# Patient Record
Sex: FEMALE | Race: WHITE | NOT HISPANIC OR LATINO
[De-identification: names, ages, dates, MRNs, and addresses within clinical notes are randomized per-mention and may not be internally consistent; named-entity substitution may affect disease eponyms.]

---

## 2018-09-14 ENCOUNTER — TRANSCRIPTION ENCOUNTER (OUTPATIENT)
Age: 43
End: 2018-09-14

## 2018-12-03 ENCOUNTER — TRANSCRIPTION ENCOUNTER (OUTPATIENT)
Age: 43
End: 2018-12-03

## 2019-01-30 ENCOUNTER — RESULT REVIEW (OUTPATIENT)
Age: 44
End: 2019-01-30

## 2019-01-30 ENCOUNTER — APPOINTMENT (OUTPATIENT)
Dept: OBGYN | Facility: CLINIC | Age: 44
End: 2019-01-30
Payer: COMMERCIAL

## 2019-01-30 PROCEDURE — 36415 COLL VENOUS BLD VENIPUNCTURE: CPT

## 2019-01-30 PROCEDURE — 99386 PREV VISIT NEW AGE 40-64: CPT

## 2019-02-05 ENCOUNTER — TRANSCRIPTION ENCOUNTER (OUTPATIENT)
Age: 44
End: 2019-02-05

## 2019-02-06 ENCOUNTER — APPOINTMENT (OUTPATIENT)
Dept: ULTRASOUND IMAGING | Facility: IMAGING CENTER | Age: 44
End: 2019-02-06
Payer: COMMERCIAL

## 2019-02-06 ENCOUNTER — OUTPATIENT (OUTPATIENT)
Dept: OUTPATIENT SERVICES | Facility: HOSPITAL | Age: 44
LOS: 1 days | End: 2019-02-06
Payer: COMMERCIAL

## 2019-02-06 DIAGNOSIS — Z00.8 ENCOUNTER FOR OTHER GENERAL EXAMINATION: ICD-10-CM

## 2019-02-06 PROCEDURE — 76770 US EXAM ABDO BACK WALL COMP: CPT | Mod: 26

## 2019-02-06 PROCEDURE — 76770 US EXAM ABDO BACK WALL COMP: CPT

## 2019-02-25 ENCOUNTER — APPOINTMENT (OUTPATIENT)
Dept: ULTRASOUND IMAGING | Facility: CLINIC | Age: 44
End: 2019-02-25
Payer: COMMERCIAL

## 2019-02-25 ENCOUNTER — OUTPATIENT (OUTPATIENT)
Dept: OUTPATIENT SERVICES | Facility: HOSPITAL | Age: 44
LOS: 1 days | End: 2019-02-25
Payer: COMMERCIAL

## 2019-02-25 DIAGNOSIS — Z00.8 ENCOUNTER FOR OTHER GENERAL EXAMINATION: ICD-10-CM

## 2019-02-25 PROCEDURE — 76830 TRANSVAGINAL US NON-OB: CPT | Mod: 26

## 2019-02-25 PROCEDURE — 76830 TRANSVAGINAL US NON-OB: CPT

## 2019-02-25 PROCEDURE — 76856 US EXAM PELVIC COMPLETE: CPT | Mod: 26

## 2019-02-25 PROCEDURE — 76856 US EXAM PELVIC COMPLETE: CPT

## 2019-02-27 ENCOUNTER — APPOINTMENT (OUTPATIENT)
Dept: MAMMOGRAPHY | Facility: IMAGING CENTER | Age: 44
End: 2019-02-27
Payer: COMMERCIAL

## 2019-02-27 ENCOUNTER — APPOINTMENT (OUTPATIENT)
Dept: ULTRASOUND IMAGING | Facility: IMAGING CENTER | Age: 44
End: 2019-02-27
Payer: COMMERCIAL

## 2019-02-27 ENCOUNTER — OUTPATIENT (OUTPATIENT)
Dept: OUTPATIENT SERVICES | Facility: HOSPITAL | Age: 44
LOS: 1 days | End: 2019-02-27
Payer: COMMERCIAL

## 2019-02-27 DIAGNOSIS — Z00.8 ENCOUNTER FOR OTHER GENERAL EXAMINATION: ICD-10-CM

## 2019-02-27 PROCEDURE — 77063 BREAST TOMOSYNTHESIS BI: CPT | Mod: 26

## 2019-02-27 PROCEDURE — 76641 ULTRASOUND BREAST COMPLETE: CPT

## 2019-02-27 PROCEDURE — 77067 SCR MAMMO BI INCL CAD: CPT | Mod: 26

## 2019-02-27 PROCEDURE — 76641 ULTRASOUND BREAST COMPLETE: CPT | Mod: 26,50

## 2019-02-27 PROCEDURE — 77063 BREAST TOMOSYNTHESIS BI: CPT

## 2019-02-27 PROCEDURE — 77067 SCR MAMMO BI INCL CAD: CPT

## 2019-03-25 ENCOUNTER — APPOINTMENT (OUTPATIENT)
Dept: OBGYN | Facility: CLINIC | Age: 44
End: 2019-03-25
Payer: COMMERCIAL

## 2019-03-25 PROCEDURE — 36415 COLL VENOUS BLD VENIPUNCTURE: CPT

## 2019-03-25 PROCEDURE — 76831 ECHO EXAM UTERUS: CPT

## 2019-03-25 PROCEDURE — 58340 CATHETER FOR HYSTEROGRAPHY: CPT

## 2019-11-06 ENCOUNTER — APPOINTMENT (OUTPATIENT)
Dept: MRI IMAGING | Facility: IMAGING CENTER | Age: 44
End: 2019-11-06
Payer: COMMERCIAL

## 2019-11-06 ENCOUNTER — OUTPATIENT (OUTPATIENT)
Dept: OUTPATIENT SERVICES | Facility: HOSPITAL | Age: 44
LOS: 1 days | End: 2019-11-06
Payer: COMMERCIAL

## 2019-11-06 DIAGNOSIS — Z00.8 ENCOUNTER FOR OTHER GENERAL EXAMINATION: ICD-10-CM

## 2019-11-06 PROCEDURE — A9585: CPT

## 2019-11-06 PROCEDURE — C8908: CPT

## 2019-11-06 PROCEDURE — 77049 MRI BREAST C-+ W/CAD BI: CPT | Mod: 26

## 2019-11-06 PROCEDURE — C8937: CPT

## 2019-11-15 ENCOUNTER — RESULT REVIEW (OUTPATIENT)
Age: 44
End: 2019-11-15

## 2019-11-15 ENCOUNTER — OUTPATIENT (OUTPATIENT)
Dept: OUTPATIENT SERVICES | Facility: HOSPITAL | Age: 44
LOS: 1 days | End: 2019-11-15
Payer: COMMERCIAL

## 2019-11-15 ENCOUNTER — APPOINTMENT (OUTPATIENT)
Dept: ULTRASOUND IMAGING | Facility: CLINIC | Age: 44
End: 2019-11-15
Payer: COMMERCIAL

## 2019-11-15 DIAGNOSIS — N63.0 UNSPECIFIED LUMP IN UNSPECIFIED BREAST: ICD-10-CM

## 2019-11-15 PROCEDURE — 88305 TISSUE EXAM BY PATHOLOGIST: CPT | Mod: 26

## 2019-11-15 PROCEDURE — 88305 TISSUE EXAM BY PATHOLOGIST: CPT

## 2019-11-15 PROCEDURE — 19083 BX BREAST 1ST LESION US IMAG: CPT | Mod: LT

## 2019-11-15 PROCEDURE — 77065 DX MAMMO INCL CAD UNI: CPT | Mod: 26,LT

## 2019-11-15 PROCEDURE — 19083 BX BREAST 1ST LESION US IMAG: CPT

## 2019-11-15 PROCEDURE — 77065 DX MAMMO INCL CAD UNI: CPT

## 2020-04-11 ENCOUNTER — TRANSCRIPTION ENCOUNTER (OUTPATIENT)
Age: 45
End: 2020-04-11

## 2020-05-26 ENCOUNTER — OUTPATIENT (OUTPATIENT)
Dept: OUTPATIENT SERVICES | Facility: HOSPITAL | Age: 45
LOS: 1 days | End: 2020-05-26
Payer: COMMERCIAL

## 2020-05-26 ENCOUNTER — APPOINTMENT (OUTPATIENT)
Dept: RADIOLOGY | Facility: IMAGING CENTER | Age: 45
End: 2020-05-26
Payer: COMMERCIAL

## 2020-05-26 ENCOUNTER — APPOINTMENT (OUTPATIENT)
Dept: CT IMAGING | Facility: IMAGING CENTER | Age: 45
End: 2020-05-26
Payer: COMMERCIAL

## 2020-05-26 DIAGNOSIS — Z00.8 ENCOUNTER FOR OTHER GENERAL EXAMINATION: ICD-10-CM

## 2020-05-26 PROCEDURE — 71260 CT THORAX DX C+: CPT | Mod: 26

## 2020-05-26 PROCEDURE — 71260 CT THORAX DX C+: CPT

## 2020-05-26 PROCEDURE — 71046 X-RAY EXAM CHEST 2 VIEWS: CPT | Mod: 26

## 2020-05-26 PROCEDURE — 71046 X-RAY EXAM CHEST 2 VIEWS: CPT

## 2020-06-03 ENCOUNTER — APPOINTMENT (OUTPATIENT)
Dept: PULMONOLOGY | Facility: CLINIC | Age: 45
End: 2020-06-03
Payer: COMMERCIAL

## 2020-06-03 VITALS
SYSTOLIC BLOOD PRESSURE: 110 MMHG | BODY MASS INDEX: 26.5 KG/M2 | HEIGHT: 62 IN | OXYGEN SATURATION: 98 % | DIASTOLIC BLOOD PRESSURE: 70 MMHG | RESPIRATION RATE: 16 BRPM | WEIGHT: 144 LBS | HEART RATE: 65 BPM | TEMPERATURE: 98.1 F

## 2020-06-03 DIAGNOSIS — Z80.9 FAMILY HISTORY OF MALIGNANT NEOPLASM, UNSPECIFIED: ICD-10-CM

## 2020-06-03 DIAGNOSIS — Z80.1 FAMILY HISTORY OF MALIGNANT NEOPLASM OF TRACHEA, BRONCHUS AND LUNG: ICD-10-CM

## 2020-06-03 DIAGNOSIS — Z72.820 SLEEP DEPRIVATION: ICD-10-CM

## 2020-06-03 DIAGNOSIS — Z80.8 FAMILY HISTORY OF MALIGNANT NEOPLASM OF OTHER ORGANS OR SYSTEMS: ICD-10-CM

## 2020-06-03 DIAGNOSIS — Z80.3 FAMILY HISTORY OF MALIGNANT NEOPLASM OF BREAST: ICD-10-CM

## 2020-06-03 LAB
CRP SERPL-MCNC: 0.12 MG/DL
ERYTHROCYTE [SEDIMENTATION RATE] IN BLOOD BY WESTERGREN METHOD: 17 MM/HR
FERRITIN SERPL-MCNC: 36 NG/ML

## 2020-06-03 PROCEDURE — 71046 X-RAY EXAM CHEST 2 VIEWS: CPT

## 2020-06-03 PROCEDURE — 94618 PULMONARY STRESS TESTING: CPT

## 2020-06-03 PROCEDURE — 99204 OFFICE O/P NEW MOD 45 MIN: CPT | Mod: 25

## 2020-06-03 RX ORDER — ALBUTEROL SULFATE 90 UG/1
108 (90 BASE) AEROSOL, METERED RESPIRATORY (INHALATION) EVERY 6 HOURS
Qty: 1 | Refills: 2 | Status: ACTIVE | COMMUNITY
Start: 2020-06-03 | End: 1900-01-01

## 2020-06-03 RX ORDER — PREDNISONE 10 MG/1
10 TABLET ORAL
Qty: 50 | Refills: 0 | Status: ACTIVE | COMMUNITY
Start: 2020-06-03 | End: 1900-01-01

## 2020-06-03 NOTE — ADDENDUM
[FreeTextEntry1] : Documented by Leodan Koenig acting as a scribe for Dr. Terrence Mayer on 06/03/2020.\par \par All medical record entries made by the Scribe were at my, Dr. Terrence Mayer's, direction and personally dictated by me on 06/03/2020. I have reviewed the chart and agree that the record accurately reflects my personal performance of the history, physical exam, assessment and plan. I have also personally directed, reviewed, and agree with the discharge instructions.

## 2020-06-03 NOTE — ASSESSMENT
[FreeTextEntry1] : Ms. LOPEZ is a 44 year old female with a history of nonsmoker, kidney stones, BRCA positive,overweight who comes into the office today for pulmonary evaluation- recent s/p COVID 19 infection; SOB, chest pressure\par \par The patient's shortness of breath is multifactorial due to:\par -pulmonary disease \par      -s/p COVID 19 Infection,  Bronchospasms/ RADS/?asthma, GERD/ LPR\par -poor breathing mechanics \par -overweight/out of shape\par -?cardiac disease (?Pleuropericarditis)\par \par \par Problem 1: s/p COVID 19 Infection residual inflammation\par -Complete blood work for CRP, D-dimer, Ferritin levels\par Immune Support Recommendations:\par -OTC Vitamin C 500mg BID \par -OTC Quercetin 250-500mg BID \par -OTC Zinc 75-100mg per day \par -OTC Melatonin 1 or 2 mg a night \par -OTC Vitamin D 1-4000mg per day \par -OTC Tonic Water 8oz per day\par \par Problem 2: Bronchospasms/ RADS/?asthma\par -add Symbicort (160) 2 inhalations BID\par -Add a course of Prednisone; 30 mg for 5 days, then 20 mg for 5 days, then 10 mg for 5 days \par -Information sheet given to the patient to be reviewed, this medication is never to be used without consulting the prescribing physician. Proper dietary restraint is necessary specifically salt containing foods, if any reaction may occur should be reported. \par -Inhaler technique reviewed as well as oral hygiene techniques reviewed with patient. Avoidance of cold air, extremes of temperature, rescue inhaler should be used before exercise. Order of medication reviewed with patient. Recommended use of a cool mist humidifier in the bedroom.\par -Asthma is believed to be caused by inherited (genetic) and environmental factor, but its exact cause is unknown. Asthma may be triggered by allergens, lung infections, or irritants in the air. Asthma triggers are different for each person  \par \par Problem 3: GERD/ LPR\par -continue Omeprazole 20 mg or 40 mg before breakfast\par -Rule of 2s: avoid eating too much, eating too fast, eating too late, eating too spicy, eating too lousy, eating two hours before bed.\par -Things to avoid including overeating, spicy foods, tight clothing, eating within three hours of bed, this list is not all inclusive. \par -For treatment of reflux, possible options discussed including diet control, H2 blockers, PPIs, as well as coating motility agents discussed as treatment options. Timing of meals and proximity of last meal to sleep were discussed. If symptoms persist, a formal gastrointestinal evaluation is needed.\par \par Problem 4: Poor Mechanics of Breathing\par - Proper breathing techniques were reviewed with an emphasis of exhalation. Patient instructed to breath in for 1 second and out for four seconds. Patient was encouraged to not talk while walking. \par \par Problem 5: Overweight\par -Weight loss, exercise, and diet control were discussed and are highly encouraged. Treatment options were given such as, aqua therapy, and contacting a nutritionist. Recommended to use the elliptical, stationary bike, less use of treadmill. Mindful eating was explained to the patient Obesity is associated with worsening asthma, shortness of breath, and potential for cardiac disease, diabetes, and other underlying medical conditions. \par \par Problem 6: Cardiac (?Pleuropericarditis) \par -recommended to follow up with a cardiologist\par -continue baby Aspirin Rx\par -Complete ESR\par \par Problem 7: health maintenance\par -s/p influenza vaccine\par -recommended strep pneumonia vaccines after age 65: Prevnar-13 vaccine, followed by Pneumo vaccine 23 one year following\par -recommended early intervention for URIs\par -recommended regular osteoporosis evaluations\par -recommended early dermatological evaluations\par -recommended after the age of 50 to the age of 70, colonoscopy every 5 years \par \par  Follow up in 6-8 weeks\par -he  is recommended to call with any changes, questions, or concerns.

## 2020-06-03 NOTE — PROCEDURE
[FreeTextEntry1] : CXR reveals a normal sized heart; reduced anterior clear space. no evidence of infiltrate or effusion--a normal appearing chest radiograph \par \par \par CT Chest (5.26.2020) reveals normal CT of chest.\par \par 6 minute walk test reveals a low saturation of 97% with no evidence of dyspnea or fatigue; walked 440.1 meters.

## 2020-06-03 NOTE — HISTORY OF PRESENT ILLNESS
[TextBox_4] : Ms. LOPEZ is a 44 year old female presenting to the office today for initial pulmonary evaluation. Her chief complaint is\par \par -she notes s/p COVID 19 infection without official testing\par -she notes albuterol inhaler did not help\par -she notes Z hector rx\par -she notes severe palpitations\par -she notes positive COVID 19 antibody test 5.4.2020\par -she notes Sx of loss of taste and minimal SOB \par -she notes dry cough nonproductive\par -she notes feelings of vibrations in chest\par -she notes feelings of inability to reach full breath capacity\par -she notes orthopnea\par -she notes cardiac monitor \par -she notes chest pressure exacerbated by learning back\par -she notes energy levels of 7/10\par -she notes exercise running half marathons\par -she notes feelings of limitation\par -she notes 2 hours of sleep on average due to Sx\par -she notes gained some weight during quarantine\par -she notes senses of smell and taste are returning\par -she notes sinuses are not leaky, drippy, or congested\par -she notes reflux treated my omeprazole 1 episode a week\par -she denies PND\par -she denies underlying seasonal allergies\par -She notes Her bowels are regular \par -she denies cramps\par \par -she denies any diarrhea, constipation, dysphagia, dizziness, leg swelling, leg pain, itchy eyes, itchy ears, sour taste in the mouth, myalgias or arthralgias.

## 2020-06-04 LAB — DEPRECATED D DIMER PPP IA-ACNC: 227 NG/ML DDU

## 2020-06-17 ENCOUNTER — OUTPATIENT (OUTPATIENT)
Dept: OUTPATIENT SERVICES | Facility: HOSPITAL | Age: 45
LOS: 1 days | End: 2020-06-17
Payer: COMMERCIAL

## 2020-06-17 ENCOUNTER — APPOINTMENT (OUTPATIENT)
Dept: CARDIOLOGY | Facility: CLINIC | Age: 45
End: 2020-06-17

## 2020-06-17 DIAGNOSIS — R07.9 CHEST PAIN, UNSPECIFIED: ICD-10-CM

## 2020-06-17 PROCEDURE — 75574 CT ANGIO HRT W/3D IMAGE: CPT | Mod: 26

## 2020-06-17 PROCEDURE — 75574 CT ANGIO HRT W/3D IMAGE: CPT

## 2020-06-22 RX ORDER — BUDESONIDE AND FORMOTEROL FUMARATE DIHYDRATE 160; 4.5 UG/1; UG/1
160-4.5 AEROSOL RESPIRATORY (INHALATION) TWICE DAILY
Qty: 3 | Refills: 1 | Status: ACTIVE | COMMUNITY
Start: 2020-06-22 | End: 1900-01-01

## 2020-06-22 RX ORDER — AMITRIPTYLINE HYDROCHLORIDE 10 MG/1
10 TABLET, FILM COATED ORAL 3 TIMES DAILY
Qty: 90 | Refills: 1 | Status: ACTIVE | COMMUNITY
Start: 2020-06-22 | End: 1900-01-01

## 2020-07-21 LAB — ERYTHROCYTE [SEDIMENTATION RATE] IN BLOOD BY WESTERGREN METHOD: 26 MM/HR

## 2020-07-22 LAB — CRP SERPL-MCNC: <0.1 MG/DL

## 2020-08-31 ENCOUNTER — APPOINTMENT (OUTPATIENT)
Dept: PULMONOLOGY | Facility: CLINIC | Age: 45
End: 2020-08-31
Payer: COMMERCIAL

## 2020-08-31 VITALS
RESPIRATION RATE: 16 BRPM | WEIGHT: 150 LBS | SYSTOLIC BLOOD PRESSURE: 100 MMHG | OXYGEN SATURATION: 98 % | HEART RATE: 75 BPM | BODY MASS INDEX: 27.6 KG/M2 | HEIGHT: 62 IN | TEMPERATURE: 98.7 F | DIASTOLIC BLOOD PRESSURE: 68 MMHG

## 2020-08-31 DIAGNOSIS — N39.0 URINARY TRACT INFECTION, SITE NOT SPECIFIED: ICD-10-CM

## 2020-08-31 DIAGNOSIS — R06.02 SHORTNESS OF BREATH: ICD-10-CM

## 2020-08-31 DIAGNOSIS — J45.909 UNSPECIFIED ASTHMA, UNCOMPLICATED: ICD-10-CM

## 2020-08-31 DIAGNOSIS — U07.1 COVID-19: ICD-10-CM

## 2020-08-31 DIAGNOSIS — E66.3 OVERWEIGHT: ICD-10-CM

## 2020-08-31 DIAGNOSIS — K21.9 GASTRO-ESOPHAGEAL REFLUX DISEASE W/OUT ESOPHAGITIS: ICD-10-CM

## 2020-08-31 DIAGNOSIS — R05 COUGH: ICD-10-CM

## 2020-08-31 PROCEDURE — 99214 OFFICE O/P EST MOD 30 MIN: CPT | Mod: 25

## 2020-08-31 PROCEDURE — 95012 NITRIC OXIDE EXP GAS DETER: CPT

## 2020-08-31 RX ORDER — IBUPROFEN 600 MG/1
600 TABLET, FILM COATED ORAL
Qty: 30 | Refills: 0 | Status: DISCONTINUED | COMMUNITY
Start: 2020-05-17

## 2020-08-31 RX ORDER — COLCHICINE 0.6 MG/1
0.6 CAPSULE ORAL
Qty: 60 | Refills: 0 | Status: DISCONTINUED | COMMUNITY
Start: 2020-05-15

## 2020-08-31 RX ORDER — NITROFURANTOIN (MONOHYDRATE/MACROCRYSTALS) 25; 75 MG/1; MG/1
100 CAPSULE ORAL
Qty: 14 | Refills: 0 | Status: DISCONTINUED | COMMUNITY
Start: 2020-04-04

## 2020-08-31 RX ORDER — BROMPHENIRAMINE MALEATE, PSEUDOEPHEDRINE HYDROCHLORIDE, 2; 30; 10 MG/5ML; MG/5ML; MG/5ML
30-2-10 SYRUP ORAL
Qty: 236 | Refills: 0 | Status: DISCONTINUED | COMMUNITY
Start: 2020-06-22

## 2020-08-31 NOTE — PHYSICAL EXAM
[Normal Oropharynx] : normal oropharynx [No Acute Distress] : no acute distress [III] : Mallampati Class: III [Normal Appearance] : normal appearance [No Neck Mass] : no neck mass [Normal Rate/Rhythm] : normal rate/rhythm [Normal S1, S2] : normal s1, s2 [No Murmurs] : no murmurs [No Resp Distress] : no resp distress [No Abnormalities] : no abnormalities [Clear to Auscultation Bilaterally] : clear to auscultation bilaterally [Benign] : benign [Normal Gait] : normal gait [No Cyanosis] : no cyanosis [No Clubbing] : no clubbing [No Edema] : no edema [Normal Color/ Pigmentation] : normal color/ pigmentation [FROM] : FROM [No Focal Deficits] : no focal deficits [Oriented x3] : oriented x3 [Normal Affect] : normal affect [TextBox_68] : I:E ratio 1:3; clear

## 2020-08-31 NOTE — HISTORY OF PRESENT ILLNESS
[TextBox_4] : Ms. LOPEZ is a 44 year old female with a history of chronic cough, Covid-19 virus infection, GERD, overweight, poor sleep, RADS, SOB presenting to the office today for a follow up pulmonary evaluation. Her chief complaint is back discomfort\par -she reports feeling generally well\par -she notes she still cant lay completely on her back due to her lung issue\par -she notes she has a metallic or smoky taste/smell\par -she notes she has gained about 6 pounds\par -she reports she doesn’t sleep well (2-3 hours per night)\par -she reports she is not able to jog yet, but is power walking\par -she notes she coughs when she drinks cold water\par -she now only takes cough syrup and aspirin\par -she uses Symbicort when she needs it, but it doesn’t help with her back pain\par -she notes her energy level is better and her breathing has improved since her last visit\par -she notes the metallic smoky taste stays when she takes symbicort\par -she denies any headaches, nausea, vomiting, fever, chills, sweats, chest pain, chest pressure, diarrhea, constipation, dysphagia, dizziness, sour taste in the mouth, leg swelling, leg pain, itchy eyes, itchy ears, heartburn, reflux, myalgias or arthralgias.

## 2020-08-31 NOTE — REASON FOR VISIT
[Follow-Up] : a follow-up visit [TextBox_44] : s/p COVID 19 Infection,  Bronchospasms/ RADS/?asthma, GERD/ LPR

## 2020-08-31 NOTE — ADDENDUM
[FreeTextEntry1] : Documented by Fredi Umana acting as a scribe for Dr. Terrence Mayer on 08/31/2020.\par \par All medical record entries made by the Scribe were at my, Dr. Terrence Mayer's, direction and personally dictated by me on 08/31/2020. I have reviewed the chart and agree that the record accurately reflects my personal performance of the history, physical exam, assessment and plan. I have also personally directed, reviewed, and agree with the discharge instructions.

## 2020-08-31 NOTE — PROCEDURE
[FreeTextEntry1] : Coronary CT reveals no significant stenosis, lungs unremarkable\par \par FENO was 9; a normal value being less than 25\par Fractional exhaled nitric oxide (FENO) is regarded as a simple, noninvasive method for assessing eosinophilic airway inflammation. Produced by a variety of cells within the lung, nitric oxide (NO) concentrations are generally low in healthy individuals. However, high concentrations of NO appear to be involved in nonspecific host defense mechanisms and chronic inflammatory diseases such as asthma. The American Thoracic Society (ATS) therefore has recommended using FENO to aid in the diagnosis and monitoring of eosinophilic airway inflammation and asthma, and for identifying steroid responsive individuals whose chronic respiratory symptoms may be caused by airway inflammation.

## 2020-08-31 NOTE — ASSESSMENT
[FreeTextEntry1] : Ms. LOPEZ is a 44 year old female with a history of nonsmoker, kidney stones, BRCA positive,overweight who comes into the office today for pulmonary evaluation- recent s/p COVID 19 infection; SOB, chest pressure\par \par The patient's shortness of breath is multifactorial due to:\par -pulmonary disease \par      -s/p COVID 19 Infection,  Bronchospasms/ RADS/?asthma, GERD/ LPR\par -poor breathing mechanics \par -overweight/out of shape\par -?cardiac disease (?Pleuropericarditis)\par \par Problem 1: s/p COVID 19 Infection residual inflammation\par -Complete blood work for CRP, D-dimer, Ferritin levels (WNL, + ESR)\par Immune Support Recommendations:\par -OTC Vitamin C 500mg BID \par -OTC Quercetin 250-500mg BID \par -OTC Zinc 75-100mg per day \par -OTC Melatonin 1 or 2 mg a night \par -OTC Vitamin D 1-4000mg per day \par -OTC Tonic Water 8oz per day\par \par Problem 2: Bronchospasms/ RADS/?asthma\par -continue Symbicort (160) 2 inhalations BID\par -Add Ventolin rescue inhaler 2 inhalations before exercise, Q6H \par -s/p a course of Prednisone; 30 mg for 5 days, then 20 mg for 5 days, then 10 mg for 5 days (ended 6/2020)\par -Information sheet given to the patient to be reviewed, this medication is never to be used without consulting the prescribing physician. Proper dietary restraint is necessary specifically salt containing foods, if any reaction may occur should be reported. \par -Inhaler technique reviewed as well as oral hygiene techniques reviewed with patient. Avoidance of cold air, extremes of temperature, rescue inhaler should be used before exercise. Order of medication reviewed with patient. Recommended use of a cool mist humidifier in the bedroom.\par -Asthma is believed to be caused by inherited (genetic) and environmental factor, but its exact cause is unknown. Asthma may be triggered by allergens, lung infections, or irritants in the air. Asthma triggers are different for each person  \par \par Problem 3: GERD/ LPR\par -continue Omeprazole 20 mg or 40 mg before breakfast\par -Rule of 2s: avoid eating too much, eating too fast, eating too late, eating too spicy, eating too lousy, eating two hours before bed.\par -Things to avoid including overeating, spicy foods, tight clothing, eating within three hours of bed, this list is not all inclusive. \par -For treatment of reflux, possible options discussed including diet control, H2 blockers, PPIs, as well as coating motility agents discussed as treatment options. Timing of meals and proximity of last meal to sleep were discussed. If symptoms persist, a formal gastrointestinal evaluation is needed.\par \par Problem 4: Poor Mechanics of Breathing\par - Proper breathing techniques were reviewed with an emphasis of exhalation. Patient instructed to breath in for 1 second and out for four seconds. Patient was encouraged to not talk while walking. \par \par Problem 5: Overweight\par -Weight loss, exercise, and diet control were discussed and are highly encouraged. Treatment options were given such as, aqua therapy, and contacting a nutritionist. Recommended to use the elliptical, stationary bike, less use of treadmill. Mindful eating was explained to the patient Obesity is associated with worsening asthma, shortness of breath, and potential for cardiac disease, diabetes, and other underlying medical conditions. \par \par Problem 6: Cardiac (?Pleuropericarditis) \par -recommended to follow up with a cardiologist\par -continue baby Aspirin Rx\par -Complete ESR (+) - s/p colchicine\par -complete repeat ESR\par \par Problem 7: health maintenance\par -s/p influenza vaccine\par -recommended strep pneumonia vaccines after age 65: Prevnar-13 vaccine, followed by Pneumo vaccine 23 one year following\par -recommended early intervention for URIs\par -recommended regular osteoporosis evaluations\par -recommended early dermatological evaluations\par -recommended after the age of 50 to the age of 70, colonoscopy every 5 years \par \par  Follow up in 3-4 months\par -he  is recommended to call with any changes, questions, or concerns.

## 2020-10-26 DIAGNOSIS — Z01.812 ENCOUNTER FOR PREPROCEDURAL LABORATORY EXAMINATION: ICD-10-CM

## 2020-11-13 ENCOUNTER — APPOINTMENT (OUTPATIENT)
Dept: PULMONOLOGY | Facility: CLINIC | Age: 45
End: 2020-11-13

## 2020-12-30 ENCOUNTER — RESULT REVIEW (OUTPATIENT)
Age: 45
End: 2020-12-30

## 2020-12-30 ENCOUNTER — APPOINTMENT (OUTPATIENT)
Dept: ULTRASOUND IMAGING | Facility: IMAGING CENTER | Age: 45
End: 2020-12-30
Payer: COMMERCIAL

## 2020-12-30 ENCOUNTER — OUTPATIENT (OUTPATIENT)
Dept: OUTPATIENT SERVICES | Facility: HOSPITAL | Age: 45
LOS: 1 days | End: 2020-12-30
Payer: COMMERCIAL

## 2020-12-30 ENCOUNTER — APPOINTMENT (OUTPATIENT)
Dept: MAMMOGRAPHY | Facility: IMAGING CENTER | Age: 45
End: 2020-12-30
Payer: COMMERCIAL

## 2020-12-30 DIAGNOSIS — Z00.8 ENCOUNTER FOR OTHER GENERAL EXAMINATION: ICD-10-CM

## 2020-12-30 PROCEDURE — 77063 BREAST TOMOSYNTHESIS BI: CPT

## 2020-12-30 PROCEDURE — 77063 BREAST TOMOSYNTHESIS BI: CPT | Mod: 26

## 2020-12-30 PROCEDURE — 77067 SCR MAMMO BI INCL CAD: CPT | Mod: 26

## 2020-12-30 PROCEDURE — 76641 ULTRASOUND BREAST COMPLETE: CPT | Mod: 26,50

## 2020-12-30 PROCEDURE — 77067 SCR MAMMO BI INCL CAD: CPT

## 2020-12-30 PROCEDURE — 76641 ULTRASOUND BREAST COMPLETE: CPT

## 2021-02-22 ENCOUNTER — RESULT REVIEW (OUTPATIENT)
Age: 46
End: 2021-02-22

## 2021-02-22 ENCOUNTER — APPOINTMENT (OUTPATIENT)
Dept: OBGYN | Facility: CLINIC | Age: 46
End: 2021-02-22
Payer: COMMERCIAL

## 2021-02-22 PROCEDURE — 99072 ADDL SUPL MATRL&STAF TM PHE: CPT

## 2021-02-22 PROCEDURE — 99396 PREV VISIT EST AGE 40-64: CPT

## 2021-04-12 ENCOUNTER — APPOINTMENT (OUTPATIENT)
Dept: ULTRASOUND IMAGING | Facility: IMAGING CENTER | Age: 46
End: 2021-04-12

## 2021-04-13 ENCOUNTER — OUTPATIENT (OUTPATIENT)
Dept: OUTPATIENT SERVICES | Facility: HOSPITAL | Age: 46
LOS: 1 days | End: 2021-04-13
Payer: COMMERCIAL

## 2021-04-13 ENCOUNTER — APPOINTMENT (OUTPATIENT)
Dept: MRI IMAGING | Facility: IMAGING CENTER | Age: 46
End: 2021-04-13
Payer: COMMERCIAL

## 2021-04-13 ENCOUNTER — RESULT REVIEW (OUTPATIENT)
Age: 46
End: 2021-04-13

## 2021-04-13 DIAGNOSIS — Z00.8 ENCOUNTER FOR OTHER GENERAL EXAMINATION: ICD-10-CM

## 2021-04-13 PROCEDURE — 72197 MRI PELVIS W/O & W/DYE: CPT | Mod: 26

## 2021-04-13 PROCEDURE — A9585: CPT

## 2021-04-13 PROCEDURE — 72197 MRI PELVIS W/O & W/DYE: CPT

## 2021-06-14 ENCOUNTER — RESULT REVIEW (OUTPATIENT)
Age: 46
End: 2021-06-14

## 2021-06-14 ENCOUNTER — OUTPATIENT (OUTPATIENT)
Dept: OUTPATIENT SERVICES | Facility: HOSPITAL | Age: 46
LOS: 1 days | End: 2021-06-14
Payer: COMMERCIAL

## 2021-06-14 ENCOUNTER — APPOINTMENT (OUTPATIENT)
Dept: ULTRASOUND IMAGING | Facility: IMAGING CENTER | Age: 46
End: 2021-06-14
Payer: COMMERCIAL

## 2021-06-14 DIAGNOSIS — Z00.8 ENCOUNTER FOR OTHER GENERAL EXAMINATION: ICD-10-CM

## 2021-06-14 PROCEDURE — 76830 TRANSVAGINAL US NON-OB: CPT | Mod: 26

## 2021-06-14 PROCEDURE — 76830 TRANSVAGINAL US NON-OB: CPT

## 2021-06-14 PROCEDURE — 76856 US EXAM PELVIC COMPLETE: CPT

## 2021-06-14 PROCEDURE — 76856 US EXAM PELVIC COMPLETE: CPT | Mod: 26

## 2022-04-10 ENCOUNTER — TRANSCRIPTION ENCOUNTER (OUTPATIENT)
Age: 47
End: 2022-04-10

## 2022-10-25 DIAGNOSIS — R92.2 INCONCLUSIVE MAMMOGRAM: ICD-10-CM

## 2022-10-26 ENCOUNTER — RESULT REVIEW (OUTPATIENT)
Age: 47
End: 2022-10-26

## 2022-10-26 ENCOUNTER — APPOINTMENT (OUTPATIENT)
Dept: MRI IMAGING | Facility: IMAGING CENTER | Age: 47
End: 2022-10-26

## 2022-10-26 ENCOUNTER — OUTPATIENT (OUTPATIENT)
Dept: OUTPATIENT SERVICES | Facility: HOSPITAL | Age: 47
LOS: 1 days | End: 2022-10-26
Payer: COMMERCIAL

## 2022-10-26 ENCOUNTER — APPOINTMENT (OUTPATIENT)
Dept: ORTHOPEDIC SURGERY | Facility: CLINIC | Age: 47
End: 2022-10-26

## 2022-10-26 DIAGNOSIS — Z00.8 ENCOUNTER FOR OTHER GENERAL EXAMINATION: ICD-10-CM

## 2022-10-26 DIAGNOSIS — M62.830 MUSCLE SPASM OF BACK: ICD-10-CM

## 2022-10-26 DIAGNOSIS — S29.019A STRAIN OF MUSCLE AND TENDON OF UNSPECIFIED WALL OF THORAX, INITIAL ENCOUNTER: ICD-10-CM

## 2022-10-26 DIAGNOSIS — S39.012A STRAIN OF MUSCLE, FASCIA AND TENDON OF LOWER BACK, INITIAL ENCOUNTER: ICD-10-CM

## 2022-10-26 PROCEDURE — C8937: CPT

## 2022-10-26 PROCEDURE — 77049 MRI BREAST C-+ W/CAD BI: CPT | Mod: 26

## 2022-10-26 PROCEDURE — A9585: CPT

## 2022-10-26 PROCEDURE — 99204 OFFICE O/P NEW MOD 45 MIN: CPT

## 2022-10-26 PROCEDURE — 72070 X-RAY EXAM THORAC SPINE 2VWS: CPT

## 2022-10-26 PROCEDURE — 72110 X-RAY EXAM L-2 SPINE 4/>VWS: CPT

## 2022-10-26 PROCEDURE — C8908: CPT

## 2022-10-26 NOTE — DISCUSSION/SUMMARY
[de-identified] : reviewed the case - no red flags \par discussion of tx options \par shes going to keep an eye on it
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
## 2023-09-11 ENCOUNTER — APPOINTMENT (OUTPATIENT)
Dept: OBGYN | Facility: CLINIC | Age: 48
End: 2023-09-11
Payer: COMMERCIAL

## 2023-09-11 ENCOUNTER — LABORATORY RESULT (OUTPATIENT)
Age: 48
End: 2023-09-11

## 2023-09-11 VITALS
HEIGHT: 62 IN | SYSTOLIC BLOOD PRESSURE: 101 MMHG | HEART RATE: 59 BPM | BODY MASS INDEX: 24.84 KG/M2 | OXYGEN SATURATION: 96 % | DIASTOLIC BLOOD PRESSURE: 56 MMHG | WEIGHT: 135 LBS

## 2023-09-11 DIAGNOSIS — Z11.51 ENCOUNTER FOR SCREENING FOR HUMAN PAPILLOMAVIRUS (HPV): ICD-10-CM

## 2023-09-11 DIAGNOSIS — Z01.419 ENCOUNTER FOR GYNECOLOGICAL EXAMINATION (GENERAL) (ROUTINE) W/OUT ABNORMAL FINDINGS: ICD-10-CM

## 2023-09-11 DIAGNOSIS — Z15.09 GENETIC SUSCEPTIBILITY TO MALIGNANT NEOPLASM OF BREAST: ICD-10-CM

## 2023-09-11 DIAGNOSIS — Z15.01 GENETIC SUSCEPTIBILITY TO MALIGNANT NEOPLASM OF BREAST: ICD-10-CM

## 2023-09-11 PROCEDURE — 99396 PREV VISIT EST AGE 40-64: CPT

## 2023-09-11 PROCEDURE — 99213 OFFICE O/P EST LOW 20 MIN: CPT | Mod: 25

## 2023-09-11 PROCEDURE — 36415 COLL VENOUS BLD VENIPUNCTURE: CPT

## 2023-09-19 LAB
CANCER AG125 SERPL-ACNC: 30 U/ML
CYTOLOGY CVX/VAG DOC THIN PREP: NORMAL
HPV HIGH+LOW RISK DNA PNL CVX: NOT DETECTED

## 2025-01-16 ENCOUNTER — NON-APPOINTMENT (OUTPATIENT)
Age: 50
End: 2025-01-16

## 2025-02-13 NOTE — REASON FOR VISIT
Pt informed of pos guac stool test.  PCP put in order for egd and colonscopy.  Pt expressed concern about finding a ride for these test. Pt encouraged to try calling her area [place] in [place] to see if they have any suggestions for her.     [Initial] : an initial visit [TextBox_44] : s/p COVID 19 Infection,  Bronchospasms/ RADS/?asthma, GERD/ LPR

## 2025-04-23 ENCOUNTER — RESULT REVIEW (OUTPATIENT)
Age: 50
End: 2025-04-23

## 2025-04-23 ENCOUNTER — APPOINTMENT (OUTPATIENT)
Dept: MRI IMAGING | Facility: IMAGING CENTER | Age: 50
End: 2025-04-23
Payer: COMMERCIAL

## 2025-04-23 ENCOUNTER — OUTPATIENT (OUTPATIENT)
Dept: OUTPATIENT SERVICES | Facility: HOSPITAL | Age: 50
LOS: 1 days | End: 2025-04-23
Payer: COMMERCIAL

## 2025-04-23 DIAGNOSIS — Z00.8 ENCOUNTER FOR OTHER GENERAL EXAMINATION: ICD-10-CM

## 2025-04-23 PROCEDURE — C8937: CPT

## 2025-04-23 PROCEDURE — A9585: CPT

## 2025-04-23 PROCEDURE — C8908: CPT

## 2025-04-23 PROCEDURE — 77049 MRI BREAST C-+ W/CAD BI: CPT | Mod: 26

## 2025-04-24 DIAGNOSIS — N63.20 UNSPECIFIED LUMP IN THE LEFT BREAST, UNSPECIFIED QUADRANT: ICD-10-CM

## 2025-04-29 ENCOUNTER — TRANSCRIPTION ENCOUNTER (OUTPATIENT)
Age: 50
End: 2025-04-29

## 2025-04-30 ENCOUNTER — RESULT REVIEW (OUTPATIENT)
Age: 50
End: 2025-04-30

## 2025-04-30 ENCOUNTER — OUTPATIENT (OUTPATIENT)
Dept: OUTPATIENT SERVICES | Facility: HOSPITAL | Age: 50
LOS: 1 days | End: 2025-04-30
Payer: COMMERCIAL

## 2025-04-30 ENCOUNTER — APPOINTMENT (OUTPATIENT)
Dept: MRI IMAGING | Facility: IMAGING CENTER | Age: 50
End: 2025-04-30
Payer: COMMERCIAL

## 2025-04-30 DIAGNOSIS — Z00.8 ENCOUNTER FOR OTHER GENERAL EXAMINATION: ICD-10-CM

## 2025-04-30 PROCEDURE — 88360 TUMOR IMMUNOHISTOCHEM/MANUAL: CPT | Mod: 26

## 2025-04-30 PROCEDURE — A4648: CPT

## 2025-04-30 PROCEDURE — A9585: CPT

## 2025-04-30 PROCEDURE — 19085 BX BREAST 1ST LESION MR IMAG: CPT

## 2025-04-30 PROCEDURE — 77065 DX MAMMO INCL CAD UNI: CPT

## 2025-04-30 PROCEDURE — 19085 BX BREAST 1ST LESION MR IMAG: CPT | Mod: LT

## 2025-04-30 PROCEDURE — 77065 DX MAMMO INCL CAD UNI: CPT | Mod: 26,LT

## 2025-04-30 PROCEDURE — 88305 TISSUE EXAM BY PATHOLOGIST: CPT | Mod: 26

## 2025-04-30 PROCEDURE — 88377 M/PHMTRC ALYS ISHQUANT/SEMIQ: CPT

## 2025-04-30 PROCEDURE — 88360 TUMOR IMMUNOHISTOCHEM/MANUAL: CPT

## 2025-04-30 PROCEDURE — 88305 TISSUE EXAM BY PATHOLOGIST: CPT

## 2025-04-30 PROCEDURE — 88377 M/PHMTRC ALYS ISHQUANT/SEMIQ: CPT | Mod: 26

## 2025-05-02 ENCOUNTER — NON-APPOINTMENT (OUTPATIENT)
Age: 50
End: 2025-05-02

## 2025-05-02 LAB — SURGICAL PATHOLOGY STUDY: SIGNIFICANT CHANGE UP

## 2025-05-05 ENCOUNTER — APPOINTMENT (OUTPATIENT)
Dept: SURGERY | Facility: CLINIC | Age: 50
End: 2025-05-05
Payer: COMMERCIAL

## 2025-05-05 PROCEDURE — 99205K: CUSTOM

## 2025-05-08 ENCOUNTER — APPOINTMENT (OUTPATIENT)
Dept: ULTRASOUND IMAGING | Facility: CLINIC | Age: 50
End: 2025-05-08
Payer: COMMERCIAL

## 2025-05-08 ENCOUNTER — OUTPATIENT (OUTPATIENT)
Dept: OUTPATIENT SERVICES | Facility: HOSPITAL | Age: 50
LOS: 1 days | End: 2025-05-08
Payer: COMMERCIAL

## 2025-05-08 DIAGNOSIS — Z15.01 GENETIC SUSCEPTIBILITY TO MALIGNANT NEOPLASM OF BREAST: ICD-10-CM

## 2025-05-08 DIAGNOSIS — C50.919 MALIGNANT NEOPLASM OF UNSPECIFIED SITE OF UNSPECIFIED FEMALE BREAST: ICD-10-CM

## 2025-05-08 PROCEDURE — 76830 TRANSVAGINAL US NON-OB: CPT

## 2025-05-08 PROCEDURE — 76856 US EXAM PELVIC COMPLETE: CPT | Mod: 26

## 2025-05-08 PROCEDURE — 76856 US EXAM PELVIC COMPLETE: CPT

## 2025-05-08 PROCEDURE — 76830 TRANSVAGINAL US NON-OB: CPT | Mod: 26

## 2025-05-09 ENCOUNTER — APPOINTMENT (OUTPATIENT)
Dept: PLASTIC SURGERY | Facility: CLINIC | Age: 50
End: 2025-05-09

## 2025-05-09 VITALS
SYSTOLIC BLOOD PRESSURE: 120 MMHG | BODY MASS INDEX: 26.43 KG/M2 | HEIGHT: 61 IN | OXYGEN SATURATION: 99 % | WEIGHT: 140 LBS | HEART RATE: 62 BPM | DIASTOLIC BLOOD PRESSURE: 71 MMHG

## 2025-05-09 DIAGNOSIS — Z15.09 GENETIC SUSCEPTIBILITY TO MALIGNANT NEOPLASM OF BREAST: ICD-10-CM

## 2025-05-09 DIAGNOSIS — Z15.01 GENETIC SUSCEPTIBILITY TO MALIGNANT NEOPLASM OF BREAST: ICD-10-CM

## 2025-05-09 DIAGNOSIS — C50.919 MALIGNANT NEOPLASM OF UNSPECIFIED SITE OF UNSPECIFIED FEMALE BREAST: ICD-10-CM

## 2025-05-09 PROCEDURE — 99245 OFF/OP CONSLTJ NEW/EST HI 55: CPT

## 2025-05-16 ENCOUNTER — TRANSCRIPTION ENCOUNTER (OUTPATIENT)
Age: 50
End: 2025-05-16

## 2025-05-16 ENCOUNTER — OUTPATIENT (OUTPATIENT)
Dept: OUTPATIENT SERVICES | Facility: HOSPITAL | Age: 50
LOS: 1 days | End: 2025-05-16
Payer: COMMERCIAL

## 2025-05-16 ENCOUNTER — APPOINTMENT (OUTPATIENT)
Dept: MRI IMAGING | Facility: CLINIC | Age: 50
End: 2025-05-16

## 2025-05-16 DIAGNOSIS — C50.919 MALIGNANT NEOPLASM OF UNSPECIFIED SITE OF UNSPECIFIED FEMALE BREAST: ICD-10-CM

## 2025-05-16 PROCEDURE — C8920: CPT

## 2025-05-16 PROCEDURE — A9585: CPT

## 2025-05-16 PROCEDURE — 74185 MRA ABD W OR W/O CNTRST: CPT | Mod: 26

## 2025-05-16 PROCEDURE — 72198 MR ANGIO PELVIS W/O & W/DYE: CPT | Mod: 26

## 2025-05-16 PROCEDURE — C8902: CPT

## 2025-06-03 ENCOUNTER — OUTPATIENT (OUTPATIENT)
Dept: OUTPATIENT SERVICES | Facility: HOSPITAL | Age: 50
LOS: 1 days | End: 2025-06-03

## 2025-06-03 VITALS
WEIGHT: 141.98 LBS | DIASTOLIC BLOOD PRESSURE: 69 MMHG | RESPIRATION RATE: 16 BRPM | HEART RATE: 67 BPM | SYSTOLIC BLOOD PRESSURE: 115 MMHG | TEMPERATURE: 98 F | HEIGHT: 61 IN | OXYGEN SATURATION: 97 %

## 2025-06-03 DIAGNOSIS — C50.919 MALIGNANT NEOPLASM OF UNSPECIFIED SITE OF UNSPECIFIED FEMALE BREAST: ICD-10-CM

## 2025-06-03 DIAGNOSIS — Z40.01 ENCOUNTER FOR PROPHYLACTIC REMOVAL OF BREAST: ICD-10-CM

## 2025-06-03 DIAGNOSIS — Z98.890 OTHER SPECIFIED POSTPROCEDURAL STATES: Chronic | ICD-10-CM

## 2025-06-03 DIAGNOSIS — Z98.891 HISTORY OF UTERINE SCAR FROM PREVIOUS SURGERY: Chronic | ICD-10-CM

## 2025-06-03 LAB
BASOPHILS # BLD AUTO: 0.02 K/UL — SIGNIFICANT CHANGE UP (ref 0–0.2)
BASOPHILS NFR BLD AUTO: 0.4 % — SIGNIFICANT CHANGE UP (ref 0–2)
BLD GP AB SCN SERPL QL: NEGATIVE — SIGNIFICANT CHANGE UP
EOSINOPHIL # BLD AUTO: 0.02 K/UL — SIGNIFICANT CHANGE UP (ref 0–0.5)
EOSINOPHIL NFR BLD AUTO: 0.4 % — SIGNIFICANT CHANGE UP (ref 0–6)
HCT VFR BLD CALC: 38.9 % — SIGNIFICANT CHANGE UP (ref 34.5–45)
HGB BLD-MCNC: 12.8 G/DL — SIGNIFICANT CHANGE UP (ref 11.5–15.5)
IMM GRANULOCYTES NFR BLD AUTO: 0.2 % — SIGNIFICANT CHANGE UP (ref 0–0.9)
LYMPHOCYTES # BLD AUTO: 1.22 K/UL — SIGNIFICANT CHANGE UP (ref 1–3.3)
LYMPHOCYTES # BLD AUTO: 23.1 % — SIGNIFICANT CHANGE UP (ref 13–44)
MCHC RBC-ENTMCNC: 27.5 PG — SIGNIFICANT CHANGE UP (ref 27–34)
MCHC RBC-ENTMCNC: 32.9 G/DL — SIGNIFICANT CHANGE UP (ref 32–36)
MCV RBC AUTO: 83.7 FL — SIGNIFICANT CHANGE UP (ref 80–100)
MONOCYTES # BLD AUTO: 0.23 K/UL — SIGNIFICANT CHANGE UP (ref 0–0.9)
MONOCYTES NFR BLD AUTO: 4.3 % — SIGNIFICANT CHANGE UP (ref 2–14)
NEUTROPHILS # BLD AUTO: 3.79 K/UL — SIGNIFICANT CHANGE UP (ref 1.8–7.4)
NEUTROPHILS NFR BLD AUTO: 71.6 % — SIGNIFICANT CHANGE UP (ref 43–77)
PLATELET # BLD AUTO: 262 K/UL — SIGNIFICANT CHANGE UP (ref 150–400)
RBC # BLD: 4.65 M/UL — SIGNIFICANT CHANGE UP (ref 3.8–5.2)
RBC # FLD: 14.3 % — SIGNIFICANT CHANGE UP (ref 10.3–14.5)
RH IG SCN BLD-IMP: POSITIVE — SIGNIFICANT CHANGE UP
RH IG SCN BLD-IMP: POSITIVE — SIGNIFICANT CHANGE UP
WBC # BLD: 5.29 K/UL — SIGNIFICANT CHANGE UP (ref 3.8–10.5)
WBC # FLD AUTO: 5.29 K/UL — SIGNIFICANT CHANGE UP (ref 3.8–10.5)

## 2025-06-03 RX ORDER — SODIUM CHLORIDE 9 G/1000ML
1000 INJECTION, SOLUTION INTRAVENOUS
Refills: 0 | Status: DISCONTINUED | OUTPATIENT
Start: 2025-06-17 | End: 2025-06-18

## 2025-06-03 RX ORDER — ENOXAPARIN SODIUM 40 MG/.4ML
40 INJECTION, SOLUTION SUBCUTANEOUS
Qty: 30 | Refills: 0 | Status: ACTIVE | COMMUNITY
Start: 2025-06-03 | End: 1900-01-01

## 2025-06-03 NOTE — H&P PST ADULT - EKG AND INTERPRETATION
2020 - performed with Cardiologist, w/u for palpitations while she had covid, now resolved with no recurrence

## 2025-06-03 NOTE — H&P PST ADULT - PROBLEM SELECTOR PLAN 1
Patient is tentatively scheduled for Bilateral Nipple Sparing Simple Mastectomy with Left Sentinal Lymph Node Biopsy, Possible Axillary Dissection, OLIVIA Flap for Bilat Breast Recon, IV Angiography with Injection of Indocyanine Green, B/L Internal Mammary Lymph Node Biopsy, B/L Placement of Acellular Dermis, Bilateral Partial Rib Resection, B/L Placement of Breast on 6/17/25. Sterile cup given, instructed to bring urine sample morning of surgery for UCG. Pre-op instructions provided to patient. Patient given verbal and written instructions on Chlorhexidine soap and Pepcid. Patient verbalized understanding.     T&S, ABO, CBC sent at [facility]  UCG ordered for day of surgery

## 2025-06-03 NOTE — H&P PST ADULT - NSICDXFAMILYHX_GEN_ALL_CORE_FT
FAMILY HISTORY:  Mother  Still living? Yes, Estimated age: Age Unknown  FH: breast cancer, Age at diagnosis: Age Unknown  FH: type 2 diabetes, Age at diagnosis: Age Unknown    Sibling  Still living? Unknown  FH: breast cancer, Age at diagnosis: Age Unknown  FH: ovarian cancer, Age at diagnosis: Age Unknown  FH: type 2 diabetes, Age at diagnosis: Age Unknown

## 2025-06-03 NOTE — H&P PST ADULT - HISTORY OF PRESENT ILLNESS
49 year old female with BRCA 2 mutation recently diagnosed with left breast cancer. She underwent an MRI guided biopsy showing invasive moderately differentiated ductal carcinoma in left lower outer breast. She is scheduled for Bilateral Nipple Sparing Simple Mastectomy with Left Sentinal Lymph Node Biopsy, Possible Axillary Dissection, OLIVIA Flap for Bilat Breast Recon, IV Angiography with Injection of Indocyanine Green, B/L Internal Mammary Lymph Node Biopsy, B/L Placement of Acellular Dermis, Bilateral Partial Rib Resection, B/L Placement of Breast.

## 2025-06-03 NOTE — H&P PST ADULT - REASON FOR ADMISSION
Bilateral Nipple Sparing Simple Mastectomy with Left Sentinal Lymph Node Biopsy, Possible Axillary Dissection, OLIVIA Flap for Bilat Breast Recon, IV Angiography with Injection of Indocyanine Green, B/L Internal Mammary Lymph Node Biopsy, B/L Placement of Acellular Dermis, Bilateral Partial Rib Resection, B/L Placement of Breast Tissue Expander

## 2025-06-03 NOTE — H&P PST ADULT - NS PRO FEM  PAP SMEARS 3YRS
"Anesthesia Pre-Procedure Evaluation    Patient: Pam Chino   MRN: [id number] : 1944        Procedure : Procedure(s):  RIGHT LUMBAR 4-5 TRANSFORAMINAL LUMBAR INTERBODY FUSION          Past Medical History:   Diagnosis Date     Anxiety      Basal cell carcinoma      Chest pain 2011     Depression      Gastritis      GERD (gastroesophageal reflux disease)      Nodular basal cell carcinoma 2014    Left distal calf      Osteoporosis      Patella fracture 2006     Scoliosis       Past Surgical History:   Procedure Laterality Date     COLONOSCOPY       MOHS MICROGRAPHIC PROCEDURE Left     BCE distal calf     OTHER SURGICAL HISTORY  1997    laser cone biopsy     OTHER SURGICAL HISTORY  Oct. 2015    nose bxCA     SKIN CANCER EXCISION      maru. leg- in office     UPPER GASTROINTESTINAL ENDOSCOPY       ZZC TOTAL KNEE ARTHROPLASTY Right 2015    Procedure: RIGHT KNEE TOTAL ARTHROPLASTY;  Surgeon: Benny Lopez MD;  Location: [facility];  Service: Orthopedics      Allergies   Allergen Reactions     Ibuprofen Other (See Comments)     Stomach Bleed      Other Environmental Allergy Other (See Comments)     Bandaids cause redness      Social History     Tobacco Use     Smoking status: Former     Current packs/day: 0.00     Types: Cigarettes     Start date: 11/10/1975     Quit date: 11/10/1980     Years since quittin.0     Passive exposure: Past     Smokeless tobacco: Never     Tobacco comments:     \"socially\"   Substance Use Topics     Alcohol use: Yes     Comment: Alcoholic Drinks/day: social      Wt Readings from Last 1 Encounters:   24 62.6 kg (138 lb)        Anesthesia Evaluation   Pt has had prior anesthetic.     No history of anesthetic complications       ROS/MED HX  ENT/Pulmonary:    (-) tobacco use and sleep apnea   Neurologic:       Cardiovascular:     (+)  hypertension- -   -  - -                                      METS/Exercise Tolerance:     Hematologic: " "      Musculoskeletal:       GI/Hepatic:     (+) GERD, Asymptomatic on medication,                  Renal/Genitourinary:       Endo:       Psychiatric/Substance Use:     (+) psychiatric history anxiety and depression       Infectious Disease:       Malignancy:       Other:          Physical Exam    Airway        Mallampati: I   TM distance: > 3 FB   Neck ROM: full   Mouth opening: > 3 cm    Respiratory Devices and Support         Dental       (+) Minor Abnormalities - some fillings, tiny chips      Cardiovascular          Rhythm and rate: regular and normal     Pulmonary           breath sounds clear to auscultation       OUTSIDE LABS:  CBC: No results found for: \"WBC\", \"HGB\", \"HCT\", \"PLT\"  BMP:   Lab Results   Component Value Date     11/19/2024     06/27/2024    POTASSIUM 4.7 11/19/2024    POTASSIUM 4.8 06/27/2024    CHLORIDE 103 11/19/2024    CHLORIDE 105 06/27/2024    CO2 27 11/19/2024    CO2 25 06/27/2024    BUN 16.2 11/19/2024    BUN 15.9 06/27/2024    CR 0.82 11/19/2024    CR 0.95 06/27/2024     (H) 12/02/2024    GLC 96 11/19/2024     COAGS: No results found for: \"PTT\", \"INR\", \"FIBR\"  POC: No results found for: \"BGM\", \"HCG\", \"HCGS\"  HEPATIC:   Lab Results   Component Value Date    ALBUMIN 4.1 06/27/2024    PROTTOTAL 7.1 06/27/2024    ALT 15 06/27/2024    AST 25 06/27/2024    ALKPHOS 67 06/27/2024    BILITOTAL 0.4 06/27/2024     OTHER:   Lab Results   Component Value Date    PIPER 10.3 11/19/2024    LIPASE 36 08/28/2018    SED 15 02/03/2023       Anesthesia Plan    ASA Status:  2    NPO Status:  NPO Appropriate    Anesthesia Type: General.     - Airway: ETT   Induction: Intravenous.   Maintenance: Balanced.        Consents    Anesthesia Plan(s) and associated risks, benefits, and realistic alternatives discussed. Questions answered and patient/representative(s) expressed understanding.     - Discussed: Risks, Benefits and Alternatives for BOTH SEDATION and the PROCEDURE were discussed     - " Discussed with:  Patient            Postoperative Care    Pain management: IV analgesics, Oral pain medications.     - Plan for long acting post-op opioid use   PONV prophylaxis: Ondansetron (or other 5HT-3), Dexamethasone or Solumedrol     Comments:             Leslie Goldberg, MD    I have reviewed the pertinent notes and labs in the chart from the past 30 days and (re)examined the patient.  Any updates or changes from those notes are reflected in this note.               # Hypertension: Noted on problem list                      yes

## 2025-06-06 ENCOUNTER — NON-APPOINTMENT (OUTPATIENT)
Age: 50
End: 2025-06-06

## 2025-06-13 PROBLEM — N20.0 CALCULUS OF KIDNEY: Chronic | Status: ACTIVE | Noted: 2025-06-03

## 2025-06-13 PROBLEM — U09.9 POST COVID-19 CONDITION, UNSPECIFIED: Chronic | Status: ACTIVE | Noted: 2025-06-03

## 2025-06-13 PROBLEM — C50.919 MALIGNANT NEOPLASM OF UNSPECIFIED SITE OF UNSPECIFIED FEMALE BREAST: Chronic | Status: ACTIVE | Noted: 2025-06-03

## 2025-06-16 NOTE — ASU PATIENT PROFILE, ADULT - FALL HARM RISK - CONCLUSION
pt a&ox3, vss, c/o dark stools since yesterday w/ associated dizziness. pt in NAD @ this time, respirations even and unlabored, meds gvn per rx, POC discussed w/ patient, will continue to reassess. Universal Safety Interventions

## 2025-06-17 ENCOUNTER — TRANSCRIPTION ENCOUNTER (OUTPATIENT)
Age: 50
End: 2025-06-17

## 2025-06-17 ENCOUNTER — APPOINTMENT (OUTPATIENT)
Dept: PLASTIC SURGERY | Facility: HOSPITAL | Age: 50
End: 2025-06-17
Payer: COMMERCIAL

## 2025-06-17 ENCOUNTER — APPOINTMENT (OUTPATIENT)
Dept: SURGERY | Facility: HOSPITAL | Age: 50
End: 2025-06-17

## 2025-06-17 ENCOUNTER — APPOINTMENT (OUTPATIENT)
Dept: NUCLEAR MEDICINE | Facility: HOSPITAL | Age: 50
End: 2025-06-17

## 2025-06-17 ENCOUNTER — INPATIENT (INPATIENT)
Facility: HOSPITAL | Age: 50
LOS: 1 days | Discharge: HOME | End: 2025-06-19
Attending: SURGERY | Admitting: SURGERY
Payer: COMMERCIAL

## 2025-06-17 VITALS
DIASTOLIC BLOOD PRESSURE: 70 MMHG | TEMPERATURE: 98 F | OXYGEN SATURATION: 100 % | SYSTOLIC BLOOD PRESSURE: 109 MMHG | HEIGHT: 61 IN | HEART RATE: 68 BPM | RESPIRATION RATE: 16 BRPM | WEIGHT: 141.98 LBS

## 2025-06-17 DIAGNOSIS — Z98.890 OTHER SPECIFIED POSTPROCEDURAL STATES: Chronic | ICD-10-CM

## 2025-06-17 DIAGNOSIS — Z98.891 HISTORY OF UTERINE SCAR FROM PREVIOUS SURGERY: Chronic | ICD-10-CM

## 2025-06-17 DIAGNOSIS — Z40.01 ENCOUNTER FOR PROPHYLACTIC REMOVAL OF BREAST: ICD-10-CM

## 2025-06-17 LAB
CA-I BLD-SCNC: 1.11 MMOL/L — LOW (ref 1.15–1.29)
HCG UR QL: NEGATIVE — SIGNIFICANT CHANGE UP
HCT VFR BLD CALC: 31.6 % — LOW (ref 34.5–45)
HGB BLD-MCNC: 10.5 G/DL — LOW (ref 11.5–15.5)
MCHC RBC-ENTMCNC: 27.3 PG — SIGNIFICANT CHANGE UP (ref 27–34)
MCHC RBC-ENTMCNC: 33.2 G/DL — SIGNIFICANT CHANGE UP (ref 32–36)
MCV RBC AUTO: 82.3 FL — SIGNIFICANT CHANGE UP (ref 80–100)
NRBC # BLD AUTO: 0 K/UL — SIGNIFICANT CHANGE UP (ref 0–0)
NRBC # FLD: 0 K/UL — SIGNIFICANT CHANGE UP (ref 0–0)
NRBC BLD AUTO-RTO: 0 /100 WBCS — SIGNIFICANT CHANGE UP (ref 0–0)
PLATELET # BLD AUTO: 273 K/UL — SIGNIFICANT CHANGE UP (ref 150–400)
PMV BLD: 10.6 FL — SIGNIFICANT CHANGE UP (ref 7–13)
RBC # BLD: 3.84 M/UL — SIGNIFICANT CHANGE UP (ref 3.8–5.2)
RBC # FLD: 13.8 % — SIGNIFICANT CHANGE UP (ref 10.3–14.5)
WBC # BLD: 31.59 K/UL — HIGH (ref 3.8–10.5)
WBC # FLD AUTO: 31.59 K/UL — HIGH (ref 3.8–10.5)

## 2025-06-17 PROCEDURE — 88309 TISSUE EXAM BY PATHOLOGIST: CPT | Mod: 26

## 2025-06-17 PROCEDURE — S2068: CPT | Mod: LT

## 2025-06-17 PROCEDURE — 88305 TISSUE EXAM BY PATHOLOGIST: CPT | Mod: 26

## 2025-06-17 PROCEDURE — 38792K: CUSTOM | Mod: LT

## 2025-06-17 PROCEDURE — 15777 ACELLULAR DERM MATRIX IMPLT: CPT | Mod: RT

## 2025-06-17 PROCEDURE — 21600 PARTIAL REMOVAL OF RIB: CPT | Mod: RT,59

## 2025-06-17 PROCEDURE — S2068: CPT | Mod: RT

## 2025-06-17 PROCEDURE — 38530 BIOPSY/REMOVAL LYMPH NODES: CPT | Mod: LT

## 2025-06-17 PROCEDURE — 19303K: CUSTOM | Mod: 50

## 2025-06-17 PROCEDURE — 21600 PARTIAL REMOVAL OF RIB: CPT | Mod: LT,59

## 2025-06-17 PROCEDURE — 38530 BIOPSY/REMOVAL LYMPH NODES: CPT | Mod: RT

## 2025-06-17 PROCEDURE — 38525K: CUSTOM | Mod: LT

## 2025-06-17 PROCEDURE — 88360 TUMOR IMMUNOHISTOCHEM/MANUAL: CPT | Mod: 26

## 2025-06-17 PROCEDURE — 88331 PATH CONSLTJ SURG 1 BLK 1SPC: CPT | Mod: 26

## 2025-06-17 PROCEDURE — 88307 TISSUE EXAM BY PATHOLOGIST: CPT | Mod: 26

## 2025-06-17 PROCEDURE — 15777 ACELLULAR DERM MATRIX IMPLT: CPT | Mod: LT

## 2025-06-17 PROCEDURE — 88377 M/PHMTRC ALYS ISHQUANT/SEMIQ: CPT | Mod: 26

## 2025-06-17 DEVICE — MESH PHASIX 4X6IN: Type: IMPLANTABLE DEVICE | Site: BILATERAL | Status: FUNCTIONAL

## 2025-06-17 DEVICE — COUPLER VESSEL ANASTOMOTIC 3MM: Type: IMPLANTABLE DEVICE | Site: BILATERAL | Status: FUNCTIONAL

## 2025-06-17 DEVICE — LIGATING CLIPS WECK HORIZON MEDIUM (BLUE) 24: Type: IMPLANTABLE DEVICE | Site: BILATERAL | Status: FUNCTIONAL

## 2025-06-17 DEVICE — IMPLANTABLE DEVICE: Type: IMPLANTABLE DEVICE | Site: BILATERAL | Status: FUNCTIONAL

## 2025-06-17 DEVICE — CLIP APPLIER ETHICON LIGACLIP 11.5" MEDIUM: Type: IMPLANTABLE DEVICE | Site: BILATERAL | Status: FUNCTIONAL

## 2025-06-17 DEVICE — CARTRIDGE MICROCLIP 30: Type: IMPLANTABLE DEVICE | Site: BILATERAL | Status: FUNCTIONAL

## 2025-06-17 DEVICE — COUPLER VESSEL MICROVASC ANAST 2MM GRN: Type: IMPLANTABLE DEVICE | Site: BILATERAL | Status: FUNCTIONAL

## 2025-06-17 DEVICE — LIGATING CLIPS WECK HORIZON SMALL-WIDE (RED) 24: Type: IMPLANTABLE DEVICE | Site: BILATERAL | Status: FUNCTIONAL

## 2025-06-17 DEVICE — CLIP APPLIER ETHICON LIGACLIP 9 3/8" SMALL: Type: IMPLANTABLE DEVICE | Site: BILATERAL | Status: FUNCTIONAL

## 2025-06-17 DEVICE — DOPPLER PROBE DISPOSABLE: Type: IMPLANTABLE DEVICE | Site: BILATERAL | Status: FUNCTIONAL

## 2025-06-17 RX ORDER — HYDROMORPHONE/SOD CHLOR,ISO/PF 2 MG/10 ML
0.5 SYRINGE (ML) INJECTION
Refills: 0 | Status: DISCONTINUED | OUTPATIENT
Start: 2025-06-17 | End: 2025-06-18

## 2025-06-17 RX ORDER — BUPIVACAINE 13.3 MG/ML
20 INJECTION, SUSPENSION, LIPOSOMAL INFILTRATION ONCE
Refills: 0 | Status: COMPLETED | OUTPATIENT
Start: 2025-06-17 | End: 2025-06-17

## 2025-06-17 RX ORDER — SENNA 187 MG
2 TABLET ORAL AT BEDTIME
Refills: 0 | Status: DISCONTINUED | OUTPATIENT
Start: 2025-06-17 | End: 2025-06-17

## 2025-06-17 RX ORDER — MELATONIN 5 MG
3 TABLET ORAL AT BEDTIME
Refills: 0 | Status: DISCONTINUED | OUTPATIENT
Start: 2025-06-17 | End: 2025-06-19

## 2025-06-17 RX ORDER — OXYCODONE HYDROCHLORIDE 30 MG/1
10 TABLET ORAL EVERY 4 HOURS
Refills: 0 | Status: DISCONTINUED | OUTPATIENT
Start: 2025-06-17 | End: 2025-06-19

## 2025-06-17 RX ORDER — DIPHENHYDRAMINE HCL 12.5MG/5ML
25 ELIXIR ORAL EVERY 4 HOURS
Refills: 0 | Status: DISCONTINUED | OUTPATIENT
Start: 2025-06-17 | End: 2025-06-19

## 2025-06-17 RX ORDER — SODIUM CHLORIDE 9 G/1000ML
1000 INJECTION, SOLUTION INTRAVENOUS
Refills: 0 | Status: DISCONTINUED | OUTPATIENT
Start: 2025-06-17 | End: 2025-06-18

## 2025-06-17 RX ORDER — ACETAMINOPHEN 500 MG/5ML
1000 LIQUID (ML) ORAL EVERY 6 HOURS
Refills: 0 | Status: COMPLETED | OUTPATIENT
Start: 2025-06-17 | End: 2025-06-18

## 2025-06-17 RX ORDER — ACETAMINOPHEN 500 MG/5ML
975 LIQUID (ML) ORAL EVERY 8 HOURS
Refills: 0 | Status: COMPLETED | OUTPATIENT
Start: 2025-06-17 | End: 2026-05-16

## 2025-06-17 RX ORDER — OXYCODONE HYDROCHLORIDE 30 MG/1
5 TABLET ORAL EVERY 4 HOURS
Refills: 0 | Status: DISCONTINUED | OUTPATIENT
Start: 2025-06-17 | End: 2025-06-19

## 2025-06-17 RX ORDER — METOCLOPRAMIDE HCL 10 MG
10 TABLET ORAL EVERY 8 HOURS
Refills: 0 | Status: DISCONTINUED | OUTPATIENT
Start: 2025-06-17 | End: 2025-06-19

## 2025-06-17 RX ORDER — CEFAZOLIN SODIUM IN 0.9 % NACL 3 G/100 ML
2000 INTRAVENOUS SOLUTION, PIGGYBACK (ML) INTRAVENOUS EVERY 8 HOURS
Refills: 0 | Status: COMPLETED | OUTPATIENT
Start: 2025-06-17 | End: 2025-06-19

## 2025-06-17 RX ORDER — ONDANSETRON HCL/PF 4 MG/2 ML
4 VIAL (ML) INJECTION EVERY 6 HOURS
Refills: 0 | Status: DISCONTINUED | OUTPATIENT
Start: 2025-06-17 | End: 2025-06-19

## 2025-06-17 RX ORDER — CALCIUM CARBONATE 750 MG/1
1 TABLET ORAL EVERY 4 HOURS
Refills: 0 | Status: DISCONTINUED | OUTPATIENT
Start: 2025-06-17 | End: 2025-06-19

## 2025-06-17 RX ORDER — LANOLIN/MINERAL OIL/PETROLATUM
2 OINTMENT (GRAM) OPHTHALMIC (EYE)
Refills: 0 | Status: DISCONTINUED | OUTPATIENT
Start: 2025-06-17 | End: 2025-06-19

## 2025-06-17 RX ORDER — SENNA 187 MG
2 TABLET ORAL AT BEDTIME
Refills: 0 | Status: DISCONTINUED | OUTPATIENT
Start: 2025-06-17 | End: 2025-06-19

## 2025-06-17 RX ORDER — ENOXAPARIN SODIUM 100 MG/ML
40 INJECTION SUBCUTANEOUS EVERY 24 HOURS
Refills: 0 | Status: DISCONTINUED | OUTPATIENT
Start: 2025-06-18 | End: 2025-06-19

## 2025-06-17 RX ORDER — CALCIUM GLUCONATE 20 MG/ML
1 INJECTION, SOLUTION INTRAVENOUS ONCE
Refills: 0 | Status: COMPLETED | OUTPATIENT
Start: 2025-06-17 | End: 2025-06-17

## 2025-06-17 RX ORDER — OXYCODONE HYDROCHLORIDE 30 MG/1
5 TABLET ORAL ONCE
Refills: 0 | Status: DISCONTINUED | OUTPATIENT
Start: 2025-06-17 | End: 2025-06-18

## 2025-06-17 RX ORDER — ONDANSETRON HCL/PF 4 MG/2 ML
4 VIAL (ML) INJECTION ONCE
Refills: 0 | Status: COMPLETED | OUTPATIENT
Start: 2025-06-17 | End: 2025-06-17

## 2025-06-17 RX ORDER — SIMETHICONE 80 MG
80 TABLET,CHEWABLE ORAL EVERY 6 HOURS
Refills: 0 | Status: DISCONTINUED | OUTPATIENT
Start: 2025-06-17 | End: 2025-06-19

## 2025-06-17 RX ORDER — KETOROLAC TROMETHAMINE 30 MG/ML
15 INJECTION, SOLUTION INTRAMUSCULAR; INTRAVENOUS EVERY 6 HOURS
Refills: 0 | Status: DISCONTINUED | OUTPATIENT
Start: 2025-06-17 | End: 2025-06-19

## 2025-06-17 RX ORDER — FENTANYL CITRATE-0.9 % NACL/PF 100MCG/2ML
25 SYRINGE (ML) INTRAVENOUS
Refills: 0 | Status: DISCONTINUED | OUTPATIENT
Start: 2025-06-17 | End: 2025-06-17

## 2025-06-17 RX ADMIN — SODIUM CHLORIDE 125 MILLILITER(S): 9 INJECTION, SOLUTION INTRAVENOUS at 19:46

## 2025-06-17 RX ADMIN — Medication 2 TABLET(S): at 22:15

## 2025-06-17 RX ADMIN — Medication 4 MILLIGRAM(S): at 19:35

## 2025-06-17 RX ADMIN — Medication 2 TABLET(S): at 22:17

## 2025-06-17 NOTE — DISCHARGE NOTE PROVIDER - NSDCCPCAREPLAN_GEN_ALL_CORE_FT
PRINCIPAL DISCHARGE DIAGNOSIS  Diagnosis: Malignant neoplasm of female breast  Assessment and Plan of Treatment:

## 2025-06-17 NOTE — BRIEF OPERATIVE NOTE - NSICDXBRIEFPROCEDURE_GEN_ALL_CORE_FT
PROCEDURES:  Reconstruction, breast, bilateral, with OLIVIA flap 17-Jun-2025 09:31:40  Leila Arrington

## 2025-06-17 NOTE — DISCHARGE NOTE PROVIDER - CARE PROVIDER_API CALL
Terrell Blanchard  Plastic Surgery  [address] [place] [address], [facility] [address]  [place] [address]  Phone: [phone]  Fax: [phone]  Follow Up Time:

## 2025-06-17 NOTE — BRIEF OPERATIVE NOTE - OPERATION/FINDINGS
immediate bilateral breast reconstruction with bilateral deep inferior epigastric  flaps, bilateral partial rib resection, bilateral internal mammary lymph node biopsy, bilateral placement of acellular dermal matrix

## 2025-06-17 NOTE — DISCHARGE NOTE PROVIDER - NSDCFUSCHEDAPPT_GEN_ALL_CORE_FT
[facility]  PLASTICSUR [address] [place] [address]  Scheduled Appointment: 06/23/2025    Farhana Mills  [facility]  GENSURG [address]  Scheduled Appointment: 06/23/2025

## 2025-06-17 NOTE — DISCHARGE NOTE PROVIDER - NSDCFUADDINST_GEN_ALL_CORE_FT
Activity:  - Rest at home during the first few days after surgery.  - Walking is encouraged, but strenuous exercise is not allowed until 6 weeks after surgery.   - Avoid strenuous activity. Do not lift your arms above your head. Do not lift more than 5-10  pounds.    Sleep:  Sleep on your back for the first two weeks after surgery.    Showering:  - You may shower once you get home. Let soapy water run gently over incisions, do not scrub. Place your drains around a lanyard when you shower.   - Do not take a bath or submerge yourself in water.  - You will have special adhesive glue or tape over the incisions. Do not take these off.  For the Breast:  You have just undergone a breast reconstruction with the OLIVIA flap. Your breast will likely have bruising  and possibly some blistering on the skin, which is expected after a mastectomy. You have a small patch  of skin on your breasts, which is a different color than the surrounding breast skin. This paddle of skin  comes from the abdomen and is an indicator of how the flap is doing. It is important to check this skin  paddle daily. The skin should remain the same color. If the color of the small patch changes (i.e blue,  purple, pale), please call the office immediately ([phone]).    For the Abdomen:  Your incision and belly button are covered in a special medical grade sealant, which will come off in the  office, 2-3 weeks after surgery.    Drains:  Both the breast and abdomen will have drains. It is important to empty the drains twice daily and  record the outputs. Please bring this sheet to your appointment after surgery. Based on the output, the  drains will be removed 1 to 3 weeks after surgery. For the drains to be working appropriately, the bulbs  need to be collapsed to create a light suction. The nurse in the hospital will review the drain care with  you and your family prior to discharge home. It is best to safely secure the drains to your clothes with a  safety pin.  In an effort to make you more comfortable with discharge home and to answer any questions you may  have, you may call the office at [phone] at any time with additional questions or concerns.    Call the Office:  Do not hesitate to call the office with any concerns or questions. A doctor is available to answer your  questions 24 hours a day. Please notify us immediately at [phone] if:  1. You have increased swelling, pain, or color change in the breast.  2. One breast becomes suddenly significantly larger than the other breast.  3. You have a sudden increase in swelling of the abdomen.  4. You have redness develop around the incisions.  5. You have a fever greater than 101 F.  6. You develop sudden increase in pain.  7. You develop drainage, spreading redness or foul odor  8. You have any questions.

## 2025-06-17 NOTE — CHART NOTE - NSCHARTNOTEFT_GEN_A_CORE
__________________________________________________________________   ===================>> SURGERY POST OP NOTE <<===================  -----------------------------------------------------------------------------------------------------------  Patient: TORIBIO LOPEZ 49y (1975) Female   MRN: [id number]  Location: [place] [address]  Visit: 06-17-25 Inpatient  Date: 06-18-25 @ 04:47    Procedure: S/P Bilateral Nipple Sparing Simple Mastectomy with Left Sentinal Lymph Node Biopsy, OLIVIA Flap for Bilat Breast Recon w/ PRS.     Subjective: Patient seen and examined post operatively. Reports pain as controlled. Denies nausea, vomiting, fever, chills, chest pain, SOB, cough.  __________________________________________________________________   ===================>> VITAL SIGNS / EXAM <<=========================  -----------------------------------------------------------------------------------------------------------  Physical Exam:  General: NAD, resting comfortably in bed  HEENT: Normocephalic atraumatic  Respiratory: Nonlabored respirations  Cardio: regular rate, normotensive  Chest: dressing c/d/i, drains x4 SS  __________________________________________________________________   ===================>> LAB AND IMAGING <<==========================  -----------------------------------------------------------------------------------------------------------  Complete Blood Count (06.17.25 @ 19:17)   Auto NRBC: 0 /100 WBCs  WBC Count: 31.59 K/uL  RBC Count: 3.84 M/uL  Hemoglobin: 10.5 g/dL  Hematocrit: 31.6 %  Mean Cell Volume: 82.3 fl  Mean Cell Hemoglobin: 27.3 pg  Mean Cell Hemoglobin Conc: 33.2 g/dL  Red Cell Distrib Width: 13.8 %  Platelet Count - Automated: 273 K/uL  MPV: 10.6 fL  Auto Nucleated RBC #: 0.00 K/uL  __________________________________________________________________   ===================>> ASSESSMENT AND PLAN <<======================  -----------------------------------------------------------------------------------------------------------  A:  49yFemale patient S/P Bilateral Nipple Sparing Simple Mastectomy with Left Sentinal Lymph Node Biopsy, OLIVIA Flap for Bilat Breast Recon w/ PRS. Recovering well in PACU.     P:  - f/u AM labs  - Diet: regular   - IVF @ 125  - care per plastics    E Team  [id number]

## 2025-06-17 NOTE — DISCHARGE NOTE PROVIDER - NSDCCPTREATMENT_GEN_ALL_CORE_FT
PRINCIPAL PROCEDURE  Procedure: Reconstruction, breast, bilateral, with OLIVIA flap  Findings and Treatment:

## 2025-06-17 NOTE — DISCHARGE NOTE PROVIDER - HOSPITAL COURSE
49F underwent bilateral nipple sparing mastectomies and bilateral OLIVIA flap breast reconstruction in the OR on 06-17-25.  The patient tolerated the procedure well. 4 drains were placed: 1 in each breast and 2 in the abdomen. Postoperatively the patient was sent to the PACU. The patient remained in the PACU overnight. The patient's flaps were monitored by BiOWiSH doppler and by clinical examination. On POD 1, the patient was hemodynamically stable; was transferred to a surgical floor; was advanced to a regular diet; was placed on her home medications; was out of bed to a chair and ambulating, and the patient's Gan catheter was removed. On POD 2, the BiOWiSH doppler wires were cut. During the patient's hospital course, the patient's pain was controlled by IV pain medications and then by PO pain medications.    At the time of discharge, the patient was hemodynamically stable, was tolerating PO diet, was voiding urine and passing stool, was ambulating, and was comfortable with adequate pain control. The patient was instructed to follow up with Dr. Blanchard within 1 week after discharge from the hospital. The patient felt comfortable with discharge. The patient received prescriptions for oral pain medication preoperatively from the attending surgeon. The patient had no other issues.

## 2025-06-18 LAB
ALBUMIN SERPL ELPH-MCNC: 3.5 G/DL — SIGNIFICANT CHANGE UP (ref 3.3–5)
ALP SERPL-CCNC: 50 U/L — SIGNIFICANT CHANGE UP (ref 40–120)
ALT FLD-CCNC: 22 U/L — SIGNIFICANT CHANGE UP (ref 4–33)
ANION GAP SERPL CALC-SCNC: 13 MMOL/L — SIGNIFICANT CHANGE UP (ref 7–14)
AST SERPL-CCNC: 36 U/L — HIGH (ref 4–32)
BILIRUB SERPL-MCNC: 1.2 MG/DL — SIGNIFICANT CHANGE UP (ref 0.2–1.2)
BUN SERPL-MCNC: 16 MG/DL — SIGNIFICANT CHANGE UP (ref 7–23)
CALCIUM SERPL-MCNC: 8.1 MG/DL — LOW (ref 8.4–10.5)
CHLORIDE SERPL-SCNC: 102 MMOL/L — SIGNIFICANT CHANGE UP (ref 98–107)
CO2 SERPL-SCNC: 22 MMOL/L — SIGNIFICANT CHANGE UP (ref 22–31)
CREAT SERPL-MCNC: 0.61 MG/DL — SIGNIFICANT CHANGE UP (ref 0.5–1.3)
EGFR: 110 ML/MIN/1.73M2 — SIGNIFICANT CHANGE UP
EGFR: 110 ML/MIN/1.73M2 — SIGNIFICANT CHANGE UP
GLUCOSE SERPL-MCNC: 168 MG/DL — HIGH (ref 70–99)
MAGNESIUM SERPL-MCNC: 1.7 MG/DL — SIGNIFICANT CHANGE UP (ref 1.6–2.6)
PHOSPHATE SERPL-MCNC: 2.9 MG/DL — SIGNIFICANT CHANGE UP (ref 2.5–4.5)
POTASSIUM SERPL-MCNC: 4.5 MMOL/L — SIGNIFICANT CHANGE UP (ref 3.5–5.3)
POTASSIUM SERPL-SCNC: 4.5 MMOL/L — SIGNIFICANT CHANGE UP (ref 3.5–5.3)
PROT SERPL-MCNC: 5.8 G/DL — LOW (ref 6–8.3)
SODIUM SERPL-SCNC: 137 MMOL/L — SIGNIFICANT CHANGE UP (ref 135–145)

## 2025-06-18 RX ORDER — ALBUMIN (HUMAN) 12.5 G/50ML
250 INJECTION, SOLUTION INTRAVENOUS ONCE
Refills: 0 | Status: COMPLETED | OUTPATIENT
Start: 2025-06-18 | End: 2025-06-18

## 2025-06-18 RX ORDER — SODIUM CHLORIDE 9 G/1000ML
500 INJECTION, SOLUTION INTRAVENOUS ONCE
Refills: 0 | Status: COMPLETED | OUTPATIENT
Start: 2025-06-18 | End: 2025-06-18

## 2025-06-18 RX ORDER — SODIUM CHLORIDE 9 G/1000ML
1000 INJECTION, SOLUTION INTRAVENOUS
Refills: 0 | Status: DISCONTINUED | OUTPATIENT
Start: 2025-06-18 | End: 2025-06-19

## 2025-06-18 RX ORDER — MAGNESIUM SULFATE 500 MG/ML
2 SYRINGE (ML) INJECTION ONCE
Refills: 0 | Status: COMPLETED | OUTPATIENT
Start: 2025-06-18 | End: 2025-06-18

## 2025-06-18 RX ORDER — SODIUM PHOSPHATE,DIBASIC DIHYD
15 POWDER (GRAM) MISCELLANEOUS ONCE
Refills: 0 | Status: COMPLETED | OUTPATIENT
Start: 2025-06-18 | End: 2025-06-18

## 2025-06-18 RX ADMIN — Medication 1000 MILLIGRAM(S): at 11:41

## 2025-06-18 RX ADMIN — CALCIUM GLUCONATE 100 GRAM(S): 20 INJECTION, SOLUTION INTRAVENOUS at 00:42

## 2025-06-18 RX ADMIN — Medication 100 MILLIGRAM(S): at 00:03

## 2025-06-18 RX ADMIN — KETOROLAC TROMETHAMINE 15 MILLIGRAM(S): 30 INJECTION, SOLUTION INTRAMUSCULAR; INTRAVENOUS at 17:31

## 2025-06-18 RX ADMIN — KETOROLAC TROMETHAMINE 15 MILLIGRAM(S): 30 INJECTION, SOLUTION INTRAMUSCULAR; INTRAVENOUS at 23:44

## 2025-06-18 RX ADMIN — KETOROLAC TROMETHAMINE 15 MILLIGRAM(S): 30 INJECTION, SOLUTION INTRAMUSCULAR; INTRAVENOUS at 11:41

## 2025-06-18 RX ADMIN — Medication 1000 MILLIGRAM(S): at 00:45

## 2025-06-18 RX ADMIN — Medication 400 MILLIGRAM(S): at 17:31

## 2025-06-18 RX ADMIN — KETOROLAC TROMETHAMINE 15 MILLIGRAM(S): 30 INJECTION, SOLUTION INTRAMUSCULAR; INTRAVENOUS at 06:09

## 2025-06-18 RX ADMIN — Medication 100 MILLIGRAM(S): at 13:56

## 2025-06-18 RX ADMIN — KETOROLAC TROMETHAMINE 15 MILLIGRAM(S): 30 INJECTION, SOLUTION INTRAMUSCULAR; INTRAVENOUS at 06:30

## 2025-06-18 RX ADMIN — SODIUM CHLORIDE 1000 MILLILITER(S): 9 INJECTION, SOLUTION INTRAVENOUS at 01:19

## 2025-06-18 RX ADMIN — Medication 127.5 MILLIMOLE(S): at 00:50

## 2025-06-18 RX ADMIN — ENOXAPARIN SODIUM 40 MILLIGRAM(S): 100 INJECTION SUBCUTANEOUS at 13:56

## 2025-06-18 RX ADMIN — SODIUM CHLORIDE 75 MILLILITER(S): 9 INJECTION, SOLUTION INTRAVENOUS at 21:09

## 2025-06-18 RX ADMIN — Medication 1000 MILLIGRAM(S): at 06:30

## 2025-06-18 RX ADMIN — Medication 400 MILLIGRAM(S): at 11:11

## 2025-06-18 RX ADMIN — Medication 100 MILLIGRAM(S): at 07:52

## 2025-06-18 RX ADMIN — KETOROLAC TROMETHAMINE 15 MILLIGRAM(S): 30 INJECTION, SOLUTION INTRAMUSCULAR; INTRAVENOUS at 01:00

## 2025-06-18 RX ADMIN — SODIUM CHLORIDE 75 MILLILITER(S): 9 INJECTION, SOLUTION INTRAVENOUS at 11:12

## 2025-06-18 RX ADMIN — ALBUMIN (HUMAN) 250 MILLILITER(S): 12.5 INJECTION, SOLUTION INTRAVENOUS at 01:57

## 2025-06-18 RX ADMIN — Medication 2 TABLET(S): at 21:09

## 2025-06-18 RX ADMIN — Medication 400 MILLIGRAM(S): at 00:04

## 2025-06-18 RX ADMIN — Medication 400 MILLIGRAM(S): at 06:09

## 2025-06-18 RX ADMIN — Medication 25 GRAM(S): at 01:01

## 2025-06-18 RX ADMIN — KETOROLAC TROMETHAMINE 15 MILLIGRAM(S): 30 INJECTION, SOLUTION INTRAMUSCULAR; INTRAVENOUS at 11:11

## 2025-06-18 RX ADMIN — KETOROLAC TROMETHAMINE 15 MILLIGRAM(S): 30 INJECTION, SOLUTION INTRAMUSCULAR; INTRAVENOUS at 00:37

## 2025-06-18 RX ADMIN — Medication 100 MILLIGRAM(S): at 21:09

## 2025-06-19 ENCOUNTER — TRANSCRIPTION ENCOUNTER (OUTPATIENT)
Age: 50
End: 2025-06-19

## 2025-06-19 VITALS
DIASTOLIC BLOOD PRESSURE: 61 MMHG | RESPIRATION RATE: 18 BRPM | HEART RATE: 97 BPM | OXYGEN SATURATION: 97 % | SYSTOLIC BLOOD PRESSURE: 109 MMHG | TEMPERATURE: 98 F

## 2025-06-19 RX ORDER — ACETAMINOPHEN 500 MG/5ML
975 LIQUID (ML) ORAL EVERY 8 HOURS
Refills: 0 | Status: DISCONTINUED | OUTPATIENT
Start: 2025-06-19 | End: 2025-06-19

## 2025-06-19 RX ADMIN — KETOROLAC TROMETHAMINE 15 MILLIGRAM(S): 30 INJECTION, SOLUTION INTRAMUSCULAR; INTRAVENOUS at 05:30

## 2025-06-19 RX ADMIN — SODIUM CHLORIDE 75 MILLILITER(S): 9 INJECTION, SOLUTION INTRAVENOUS at 05:14

## 2025-06-19 RX ADMIN — KETOROLAC TROMETHAMINE 15 MILLIGRAM(S): 30 INJECTION, SOLUTION INTRAMUSCULAR; INTRAVENOUS at 06:30

## 2025-06-19 RX ADMIN — Medication 100 MILLIGRAM(S): at 05:14

## 2025-06-19 RX ADMIN — KETOROLAC TROMETHAMINE 15 MILLIGRAM(S): 30 INJECTION, SOLUTION INTRAMUSCULAR; INTRAVENOUS at 12:05

## 2025-06-19 RX ADMIN — KETOROLAC TROMETHAMINE 15 MILLIGRAM(S): 30 INJECTION, SOLUTION INTRAMUSCULAR; INTRAVENOUS at 00:44

## 2025-06-19 NOTE — DISCHARGE NOTE NURSING/CASE MANAGEMENT/SOCIAL WORK - FINANCIAL ASSISTANCE
[facility] provides services at a reduced cost to those who are determined to be eligible through [facility]’s financial assistance program. Information regarding [facility]’s financial assistance program can be found by going to [email or web address] or by calling [phone].

## 2025-06-19 NOTE — PROGRESS NOTE ADULT - ASSESSMENT
plan:  d/c home  sleep on back  ok to shower on 06.20.25  follow up 06.23.25 at [address] [facility] [address], [place]
POD 1 from bilateral breast reconstruction with OLIVIA flaps. Doing well  -OOB to chair  - Remove aponte  - Transfer to floor  - flap checks  - Encourage ambulation in PM  - Continue tylenol/toradol pain control  - Dispo planning  - SCDs
48 yo F s/p bilateral nipple sparing simple mastectomy with left sentinal lymph node biopsy, OLIVIA Flap for Bilat Breast Recon w/ PRS. Recovering well in PACU.       Plan  -cont care per plastics    E Team  [id number]
s/p bilateral nipple sparing mastectomy and OLIVIA flap, POD 1  plan:  d/c aponte  OOB to chair  regular diet  encourage water intake  continue DVT chemoppx  continue flap checks

## 2025-06-19 NOTE — DISCHARGE NOTE NURSING/CASE MANAGEMENT/SOCIAL WORK - PATIENT PORTAL LINK FT
You can access the FollowMyHealth Patient Portal offered by [facility] by registering at the following website: [email or web address]. By joining [facility]’s FollowMyHealth portal, you will also be able to view your health information using other applications (apps) compatible with our system.

## 2025-06-19 NOTE — PROGRESS NOTE ADULT - SUBJECTIVE AND OBJECTIVE BOX
INTERVAL EVENTS: No acute events overnight.  SUBJECTIVE: Patient seen and examined at bedside with surgical team, patient without complaints. Denies fever, chills, CP, SOB nausea, vomiting, abdominal pain.    OBJECTIVE:    Vital Signs Last 24 Hrs  T(C): 36.7 (19 Jun 2025 12:15), Max: 37.2 (19 Jun 2025 04:45)  T(F): 98.1 (19 Jun 2025 12:15), Max: 98.9 (19 Jun 2025 04:45)  HR: 97 (19 Jun 2025 12:15) (85 - 100)  BP: 109/61 (19 Jun 2025 12:15) (97/53 - 115/74)  BP(mean): --  RR: 18 (19 Jun 2025 12:15) (17 - 18)  SpO2: 97% (19 Jun 2025 12:15) (96% - 100%)    Parameters below as of 19 Jun 2025 12:15  Patient On (Oxygen Delivery Method): room air    I&O's Detail    18 Jun 2025 07:01  -  19 Jun 2025 07:00  --------------------------------------------------------  IN:    IV PiggyBack: 150 mL    Lactated Ringers: 1350 mL    Oral Fluid: 960 mL  Total IN: 2460 mL    OUT:    Drain (mL): 92 mL    Drain (mL): 62 mL    Drain (mL): 98 mL    Drain (mL): 122.5 mL    Indwelling Catheter - Urethral (mL): 225 mL    Voided (mL): 1350 mL  Total OUT: 1949.5 mL    Total NET: 510.5 mL      19 Jun 2025 07:01  -  19 Jun 2025 15:53  --------------------------------------------------------  IN:    Oral Fluid: 240 mL  Total IN: 240 mL    OUT:    Drain (mL): 60 mL    Drain (mL): 40 mL    Drain (mL): 45 mL    Drain (mL): 65 mL    Voided (mL): 500 mL  Total OUT: 710 mL    Total NET: -470 mL      MEDICATIONS  (STANDING):  acetaminophen     Tablet .. 975 milliGRAM(s) Oral every 8 hours  ceFAZolin   IVPB 2000 milliGRAM(s) IV Intermittent every 8 hours  enoxaparin Injectable 40 milliGRAM(s) SubCutaneous every 24 hours  ketorolac   Injectable 15 milliGRAM(s) IV Push every 6 hours  senna 2 Tablet(s) Oral at bedtime    MEDICATIONS  (PRN):  artificial tears (preservative free) Ophthalmic Solution 2 Drop(s) Both EYES every 1 hour PRN Dry Eyes  benzocaine/menthol Lozenge 1 Lozenge Oral every 3 hours PRN Sore Throat  calcium carbonate    500 mG (Tums) Chewable 1 Tablet(s) Chew every 4 hours PRN Dyspepsia  diphenhydrAMINE Injectable 25 milliGRAM(s) IV Push every 4 hours PRN Itching  melatonin 3 milliGRAM(s) Oral at bedtime PRN Sleep  metoclopramide Injectable 10 milliGRAM(s) IV Push every 8 hours PRN Nausea and/or Vomiting  ondansetron Injectable 4 milliGRAM(s) IV Push every 6 hours PRN Nausea and/or Vomiting  oxyCODONE    IR 5 milliGRAM(s) Oral every 4 hours PRN Moderate Pain (4 - 6)  oxyCODONE    IR 10 milliGRAM(s) Oral every 4 hours PRN Severe Pain (7 - 10)  simethicone 80 milliGRAM(s) Chew every 6 hours PRN Gas      PHYSICAL EXAM:  General: NAD, Lying in bed   Neuro: Awake and alert  Breasts: bilateral breast flaps soft, appropriate color, strong doppler signal, drains serosang  Abd: soft, no collections, drains serosang    LABS:                        10.5   31.59 )-----------( 273      ( 17 Jun 2025 19:17 )             31.6     06-17    137  |  102  |  16  ----------------------------<  168[H]  4.5   |  22  |  0.61    Ca    8.1[L]      17 Jun 2025 23:20  Phos  2.9     06-17  Mg     1.70     06-17    TPro  5.8[L]  /  Alb  3.5  /  TBili  1.2  /  DBili  x   /  AST  36[H]  /  ALT  22  /  AlkPhos  50  06-17      LIVER FUNCTIONS - ( 17 Jun 2025 23:20 )  Alb: 3.5 g/dL / Pro: 5.8 g/dL / ALK PHOS: 50 U/L / ALT: 22 U/L / AST: 36 U/L / GGT: x           Urinalysis Basic - ( 17 Jun 2025 23:20 )    Color: x / Appearance: x / SG: x / pH: x  Gluc: 168 mg/dL / Ketone: x  / Bili: x / Urobili: x   Blood: x / Protein: x / Nitrite: x   Leuk Esterase: x / RBC: x / WBC x   Sq Epi: x / Non Sq Epi: x / Bacteria: x          
E Team Surgery Daily Progress Note  =====================================================    SUBJECTIVE: Patient seen and examined at bedside on AM rounds. Patient with no complaints    ALLERGIES:  No Known Allergies      --------------------------------------------------------------------------------------    MEDICATIONS:    Neurologic Medications  acetaminophen     Tablet .. 975 milliGRAM(s) Oral every 8 hours  acetaminophen   IVPB .. 1000 milliGRAM(s) IV Intermittent every 6 hours  ketorolac   Injectable 15 milliGRAM(s) IV Push every 6 hours  melatonin 3 milliGRAM(s) Oral at bedtime PRN Sleep  metoclopramide Injectable 10 milliGRAM(s) IV Push every 8 hours PRN Nausea and/or Vomiting  ondansetron Injectable 4 milliGRAM(s) IV Push every 6 hours PRN Nausea and/or Vomiting  oxyCODONE    IR 5 milliGRAM(s) Oral every 4 hours PRN Moderate Pain (4 - 6)  oxyCODONE    IR 10 milliGRAM(s) Oral every 4 hours PRN Severe Pain (7 - 10)    Respiratory Medications  diphenhydrAMINE Injectable 25 milliGRAM(s) IV Push every 4 hours PRN Itching    Cardiovascular Medications    Gastrointestinal Medications  calcium carbonate    500 mG (Tums) Chewable 1 Tablet(s) Chew every 4 hours PRN Dyspepsia  lactated ringers. 1000 milliLiter(s) IV Continuous <Continuous>  senna 2 Tablet(s) Oral at bedtime  simethicone 80 milliGRAM(s) Chew every 6 hours PRN Gas    Genitourinary Medications    Hematologic/Oncologic Medications  enoxaparin Injectable 40 milliGRAM(s) SubCutaneous every 24 hours    Antimicrobial/Immunologic Medications  ceFAZolin   IVPB 2000 milliGRAM(s) IV Intermittent every 8 hours    Endocrine/Metabolic Medications    Topical/Other Medications  artificial tears (preservative free) Ophthalmic Solution 2 Drop(s) Both EYES every 1 hour PRN Dry Eyes  benzocaine/menthol Lozenge 1 Lozenge Oral every 3 hours PRN Sore Throat    --------------------------------------------------------------------------------------    VITAL SIGNS:  T(C): 36.8 (06-18-25 @ 08:50), Max: 37.1 (06-17-25 @ 19:00)  HR: 80 (06-18-25 @ 08:50) (72 - 99)  BP: 117/58 (06-18-25 @ 08:50) (90/69 - 117/80)  RR: 17 (06-18-25 @ 08:50) (11 - 20)  SpO2: 100% (06-18-25 @ 08:50) (98% - 100%)  --------------------------------------------------------------------------------------    INS AND OUTS:    06-17-25 @ 07:01  -  06-18-25 @ 07:00  --------------------------------------------------------  IN: 1375 mL / OUT: 1587 mL / NET: -212 mL    06-18-25 @ 07:01  -  06-18-25 @ 11:00  --------------------------------------------------------  IN: 365 mL / OUT: 240 mL / NET: 125 mL      --------------------------------------------------------------------------------------    PHYSICAL EXAM:   General: NAD, Lying in bed   Neuro: Awake and alert  Breasts: bilateral breast flaps soft, appropriate color, strong doppler signal, drains serosang  Abd: soft, no collections, drains serosang    --------------------------------------------------------------------------------------    LABS      CBC (06-17 @ 19:17)                              10.5[L]                         31.59[H]  )----------------(  273        --    % Neutrophils, --    % Lymphocytes, ANC: --                                  31.6[L]                BMP (06-17 @ 23:20)             137     |  102     |  16    		Ca++ 1.11[L]  Ca 8.1[L]             ---------------------------------( 168[H]		Mg 1.70               4.5     |  22      |  0.61  			Ph 2.9       LFTs (06-17 @ 23:20)      TPro 5.8[L] / Alb 3.5 / TBili 1.2 / DBili -- / AST 36[H] / ALT 22 / AlkPhos 50          Assessment:  50 yo F s/p bilateral nipple sparing simple mastectomy with left sentinal lymph node biopsy, OLIVIA Flap for Bilat Breast Recon w/ PRS. Recovering well in PACU.       Plan  -cont care per plastics    E Team  [id number]  
Plastic Surgery    SUBJECTIVE: Pt seen and examined on rounds with team. No acute events overnight.        OBJECTIVE    PHYSICAL EXAM:   General: NAD, Lying in bed   Neuro: Awake and alert  Breasts: bilateral breast flaps soft, appropriate color, strong doppler signal, drains serosang  Abd: soft, no collections, drains serosang    VITALS  T(C): 36.7 (06-18-25 @ 05:00), Max: 37.1 (06-17-25 @ 19:00)  HR: 73 (06-18-25 @ 05:00) (68 - 99)  BP: 99/52 (06-18-25 @ 05:00) (90/69 - 117/80)  RR: 14 (06-18-25 @ 05:00) (11 - 20)  SpO2: 100% (06-18-25 @ 05:00) (98% - 100%)  CAPILLARY BLOOD GLUCOSE          Is/Os    06-17 @ 07:01  -  06-18 @ 06:13  --------------------------------------------------------  IN:    Lactated Ringers: 875 mL    Lactated Ringers Bolus: 500 mL  Total IN: 1375 mL    OUT:    Drain (mL): 32 mL    Drain (mL): 76 mL    Drain (mL): 57 mL    Drain (mL): 52 mL    Indwelling Catheter - Urethral (mL): 1185 mL  Total OUT: 1402 mL    Total NET: -27 mL          MEDICATIONS (STANDING): acetaminophen     Tablet .. 975 milliGRAM(s) Oral every 8 hours  acetaminophen   IVPB .. 1000 milliGRAM(s) IV Intermittent every 6 hours  ceFAZolin   IVPB 2000 milliGRAM(s) IV Intermittent every 8 hours  enoxaparin Injectable 40 milliGRAM(s) SubCutaneous every 24 hours  ketorolac   Injectable 15 milliGRAM(s) IV Push every 6 hours  lactated ringers. 1000 milliLiter(s) IV Continuous <Continuous>  senna 2 Tablet(s) Oral at bedtime    MEDICATIONS (PRN):calcium carbonate    500 mG (Tums) Chewable 1 Tablet(s) Chew every 4 hours PRN Dyspepsia  diphenhydrAMINE Injectable 25 milliGRAM(s) IV Push every 4 hours PRN Itching  HYDROmorphone  Injectable 0.5 milliGRAM(s) IV Push every 10 minutes PRN Severe Pain (7 - 10)  melatonin 3 milliGRAM(s) Oral at bedtime PRN Sleep  metoclopramide Injectable 10 milliGRAM(s) IV Push every 8 hours PRN Nausea and/or Vomiting  ondansetron Injectable 4 milliGRAM(s) IV Push every 6 hours PRN Nausea and/or Vomiting  oxyCODONE    IR 5 milliGRAM(s) Oral once PRN Moderate Pain (4 - 6)  oxyCODONE    IR 5 milliGRAM(s) Oral every 4 hours PRN Moderate Pain (4 - 6)  oxyCODONE    IR 10 milliGRAM(s) Oral every 4 hours PRN Severe Pain (7 - 10)  simethicone 80 milliGRAM(s) Chew every 6 hours PRN Gas      LABS  CBC (06-17 @ 19:17)                              10.5[L]                         31.59[H]  )----------------(  273        --    % Neutrophils, --    % Lymphocytes, ANC: --                                  31.6[L]    BMP (06-17 @ 23:20)             137     |  102     |  16    		Ca++ 1.11[L]  Ca 8.1[L]             ---------------------------------( 168[H]		Mg 1.70               4.5     |  22      |  0.61  			Ph 2.9       LFTs (06-17 @ 23:20)      TPro 5.8[L] / Alb 3.5 / TBili 1.2 / DBili -- / AST 36[H] / ALT 22 / AlkPhos 50              IMAGING STUDIES    
Patient is doing well.  She denies any cp, sob, fevers, or calf pain.  On exam, both breasts soft. No mastectomy ischemia.  OLIVIA flaps viable with good color and capillary refill. Audible doppler signal.  Abdomen soft. Incisions intact. Umbilicus viable.  Drain output sersoang at all surgical sites (without evidence of hematoma).
doing well  no cp, no sob, no fevers, no calf pain  on exam, both breasts soft. OLIVIA flaps viable. good color, cap refill, audible doppler signal.  abdomen soft. incisions intact.

## 2025-06-20 ENCOUNTER — TRANSCRIPTION ENCOUNTER (OUTPATIENT)
Age: 50
End: 2025-06-20

## 2025-06-23 ENCOUNTER — APPOINTMENT (OUTPATIENT)
Dept: SURGERY | Facility: CLINIC | Age: 50
End: 2025-06-23
Payer: COMMERCIAL

## 2025-06-23 ENCOUNTER — APPOINTMENT (OUTPATIENT)
Dept: PLASTIC SURGERY | Facility: CLINIC | Age: 50
End: 2025-06-23
Payer: COMMERCIAL

## 2025-06-23 VITALS
OXYGEN SATURATION: 97 % | SYSTOLIC BLOOD PRESSURE: 108 MMHG | HEIGHT: 61 IN | HEART RATE: 81 BPM | BODY MASS INDEX: 27.38 KG/M2 | TEMPERATURE: 98.4 F | DIASTOLIC BLOOD PRESSURE: 71 MMHG | WEIGHT: 145 LBS

## 2025-06-23 LAB — SURGICAL PATHOLOGY STUDY: SIGNIFICANT CHANGE UP

## 2025-06-23 PROCEDURE — 99024 POSTOP FOLLOW-UP VISIT: CPT

## 2025-06-27 ENCOUNTER — APPOINTMENT (OUTPATIENT)
Dept: PLASTIC SURGERY | Facility: CLINIC | Age: 50
End: 2025-06-27

## 2025-06-30 ENCOUNTER — APPOINTMENT (OUTPATIENT)
Dept: PLASTIC SURGERY | Facility: CLINIC | Age: 50
End: 2025-06-30
Payer: COMMERCIAL

## 2025-06-30 ENCOUNTER — NON-APPOINTMENT (OUTPATIENT)
Age: 50
End: 2025-06-30

## 2025-06-30 PROCEDURE — 99024 POSTOP FOLLOW-UP VISIT: CPT

## 2025-07-01 ENCOUNTER — NON-APPOINTMENT (OUTPATIENT)
Age: 50
End: 2025-07-01

## 2025-07-02 ENCOUNTER — OUTPATIENT (OUTPATIENT)
Dept: OUTPATIENT SERVICES | Facility: HOSPITAL | Age: 50
LOS: 1 days | Discharge: HOME | End: 2025-07-02

## 2025-07-02 ENCOUNTER — NON-APPOINTMENT (OUTPATIENT)
Age: 50
End: 2025-07-02

## 2025-07-02 ENCOUNTER — APPOINTMENT (OUTPATIENT)
Dept: PLASTIC SURGERY | Facility: CLINIC | Age: 50
End: 2025-07-02
Payer: COMMERCIAL

## 2025-07-02 VITALS
BODY MASS INDEX: 27.38 KG/M2 | RESPIRATION RATE: 16 BRPM | OXYGEN SATURATION: 98 % | TEMPERATURE: 98.4 F | WEIGHT: 145 LBS | HEART RATE: 75 BPM | DIASTOLIC BLOOD PRESSURE: 64 MMHG | SYSTOLIC BLOOD PRESSURE: 105 MMHG | HEIGHT: 61 IN

## 2025-07-02 DIAGNOSIS — C50.919 MALIGNANT NEOPLASM OF UNSPECIFIED SITE OF UNSPECIFIED FEMALE BREAST: ICD-10-CM

## 2025-07-02 DIAGNOSIS — Z98.890 OTHER SPECIFIED POSTPROCEDURAL STATES: Chronic | ICD-10-CM

## 2025-07-02 PROCEDURE — 99024 POSTOP FOLLOW-UP VISIT: CPT

## 2025-07-03 ENCOUNTER — APPOINTMENT (OUTPATIENT)
Dept: HEMATOLOGY ONCOLOGY | Facility: CLINIC | Age: 50
End: 2025-07-03
Payer: COMMERCIAL

## 2025-07-03 ENCOUNTER — APPOINTMENT (OUTPATIENT)
Dept: PLASTIC SURGERY | Facility: CLINIC | Age: 50
End: 2025-07-03
Payer: COMMERCIAL

## 2025-07-03 VITALS
TEMPERATURE: 98.2 F | HEART RATE: 92 BPM | BODY MASS INDEX: 27.06 KG/M2 | HEIGHT: 61 IN | OXYGEN SATURATION: 100 % | DIASTOLIC BLOOD PRESSURE: 67 MMHG | WEIGHT: 143.3 LBS | RESPIRATION RATE: 18 BRPM | SYSTOLIC BLOOD PRESSURE: 98 MMHG

## 2025-07-03 PROBLEM — Z85.3 HISTORY OF MALIGNANT NEOPLASM OF BREAST: Status: RESOLVED | Noted: 2025-05-05 | Resolved: 2025-07-03

## 2025-07-03 PROBLEM — Z87.898 HISTORY OF LUMP OF LEFT BREAST: Status: RESOLVED | Noted: 2025-04-24 | Resolved: 2025-07-03

## 2025-07-03 PROBLEM — U07.1 COVID-19 VIRUS INFECTION: Status: RESOLVED | Noted: 2020-06-03 | Resolved: 2025-07-03

## 2025-07-03 PROCEDURE — G2212 PROLONG OUTPT/OFFICE VIS: CPT

## 2025-07-03 PROCEDURE — G2211 COMPLEX E/M VISIT ADD ON: CPT

## 2025-07-03 PROCEDURE — 99205 OFFICE O/P NEW HI 60 MIN: CPT

## 2025-07-03 PROCEDURE — 99024 POSTOP FOLLOW-UP VISIT: CPT

## 2025-07-03 RX ORDER — CEFADROXIL 500 MG/1
500 CAPSULE ORAL TWICE DAILY
Qty: 14 | Refills: 0 | Status: ACTIVE | COMMUNITY
Start: 2025-07-03 | End: 1900-01-01

## 2025-07-04 ENCOUNTER — INPATIENT (INPATIENT)
Facility: HOSPITAL | Age: 50
LOS: 7 days | Discharge: HOME | DRG: 856 | End: 2025-07-12
Attending: HOSPITALIST | Admitting: INTERNAL MEDICINE
Payer: COMMERCIAL

## 2025-07-04 VITALS
WEIGHT: 143.08 LBS | HEIGHT: 61 IN | TEMPERATURE: 98 F | OXYGEN SATURATION: 97 % | HEART RATE: 101 BPM | DIASTOLIC BLOOD PRESSURE: 66 MMHG | RESPIRATION RATE: 18 BRPM | SYSTOLIC BLOOD PRESSURE: 106 MMHG

## 2025-07-04 DIAGNOSIS — L03.90 CELLULITIS, UNSPECIFIED: ICD-10-CM

## 2025-07-04 LAB
ALBUMIN SERPL ELPH-MCNC: 2.5 G/DL — LOW (ref 3.3–5)
ALP SERPL-CCNC: 80 U/L — SIGNIFICANT CHANGE UP (ref 40–120)
ALT FLD-CCNC: 60 U/L — HIGH (ref 10–45)
ANION GAP SERPL CALC-SCNC: 5 MMOL/L — SIGNIFICANT CHANGE UP (ref 5–17)
APPEARANCE UR: CLEAR — SIGNIFICANT CHANGE UP
APTT BLD: 29 SEC — SIGNIFICANT CHANGE UP (ref 26.1–36.8)
AST SERPL-CCNC: 40 U/L — SIGNIFICANT CHANGE UP (ref 10–40)
BACTERIA # UR AUTO: ABNORMAL /HPF
BASOPHILS # BLD AUTO: 0.05 K/UL — SIGNIFICANT CHANGE UP (ref 0–0.2)
BASOPHILS NFR BLD AUTO: 0.4 % — SIGNIFICANT CHANGE UP (ref 0–2)
BILIRUB SERPL-MCNC: 0.3 MG/DL — SIGNIFICANT CHANGE UP (ref 0.2–1.2)
BILIRUB UR-MCNC: NEGATIVE — SIGNIFICANT CHANGE UP
BUN SERPL-MCNC: 21 MG/DL — SIGNIFICANT CHANGE UP (ref 7–23)
CALCIUM SERPL-MCNC: 8.9 MG/DL — SIGNIFICANT CHANGE UP (ref 8.4–10.5)
CHLORIDE SERPL-SCNC: 104 MMOL/L — SIGNIFICANT CHANGE UP (ref 96–108)
CO2 SERPL-SCNC: 30 MMOL/L — SIGNIFICANT CHANGE UP (ref 22–31)
COLOR SPEC: YELLOW — SIGNIFICANT CHANGE UP
CREAT SERPL-MCNC: 0.45 MG/DL — LOW (ref 0.5–1.3)
DIFF PNL FLD: NEGATIVE — SIGNIFICANT CHANGE UP
EGFR: 119 ML/MIN/1.73M2 — SIGNIFICANT CHANGE UP
EGFR: 119 ML/MIN/1.73M2 — SIGNIFICANT CHANGE UP
EOSINOPHIL # BLD AUTO: 0.06 K/UL — SIGNIFICANT CHANGE UP (ref 0–0.5)
EOSINOPHIL NFR BLD AUTO: 0.4 % — SIGNIFICANT CHANGE UP (ref 0–6)
EPI CELLS # UR: SIGNIFICANT CHANGE UP
GLUCOSE SERPL-MCNC: 117 MG/DL — HIGH (ref 70–99)
GLUCOSE UR QL: NEGATIVE MG/DL — SIGNIFICANT CHANGE UP
HCG SERPL-ACNC: 2 MIU/ML — SIGNIFICANT CHANGE UP
HCT VFR BLD CALC: 24.2 % — LOW (ref 34.5–45)
HGB BLD-MCNC: 7.7 G/DL — LOW (ref 11.5–15.5)
IMM GRANULOCYTES NFR BLD AUTO: 0.4 % — SIGNIFICANT CHANGE UP (ref 0–0.9)
INR BLD: 1.04 RATIO — SIGNIFICANT CHANGE UP (ref 0.85–1.16)
KETONES UR QL: NEGATIVE MG/DL — SIGNIFICANT CHANGE UP
LACTATE SERPL-SCNC: 0.5 MMOL/L — LOW (ref 0.7–2)
LEUKOCYTE ESTERASE UR-ACNC: ABNORMAL
LYMPHOCYTES # BLD AUTO: 1.16 K/UL — SIGNIFICANT CHANGE UP (ref 1–3.3)
LYMPHOCYTES # BLD AUTO: 8.6 % — LOW (ref 13–44)
MCHC RBC-ENTMCNC: 26.1 PG — LOW (ref 27–34)
MCHC RBC-ENTMCNC: 31.8 G/DL — LOW (ref 32–36)
MCV RBC AUTO: 82 FL — SIGNIFICANT CHANGE UP (ref 80–100)
MONOCYTES # BLD AUTO: 0.89 K/UL — SIGNIFICANT CHANGE UP (ref 0–0.9)
MONOCYTES NFR BLD AUTO: 6.6 % — SIGNIFICANT CHANGE UP (ref 2–14)
NEUTROPHILS # BLD AUTO: 11.25 K/UL — HIGH (ref 1.8–7.4)
NEUTROPHILS NFR BLD AUTO: 83.6 % — HIGH (ref 43–77)
NITRITE UR-MCNC: NEGATIVE — SIGNIFICANT CHANGE UP
NRBC BLD AUTO-RTO: 0 /100 WBCS — SIGNIFICANT CHANGE UP (ref 0–0)
PH UR: 5.5 — SIGNIFICANT CHANGE UP (ref 5–8)
PLATELET # BLD AUTO: 363 K/UL — SIGNIFICANT CHANGE UP (ref 150–400)
POTASSIUM SERPL-MCNC: 3.4 MMOL/L — LOW (ref 3.5–5.3)
POTASSIUM SERPL-SCNC: 3.4 MMOL/L — LOW (ref 3.5–5.3)
PROT SERPL-MCNC: 6.7 G/DL — SIGNIFICANT CHANGE UP (ref 6–8.3)
PROT UR-MCNC: 30 MG/DL
PROTHROM AB SERPL-ACNC: 12.3 SEC — SIGNIFICANT CHANGE UP (ref 9.9–13.4)
RBC # BLD: 2.95 M/UL — LOW (ref 3.8–5.2)
RBC # FLD: 14 % — SIGNIFICANT CHANGE UP (ref 10.3–14.5)
RBC CASTS # UR COMP ASSIST: 1 /HPF — SIGNIFICANT CHANGE UP (ref 0–4)
SODIUM SERPL-SCNC: 139 MMOL/L — SIGNIFICANT CHANGE UP (ref 135–145)
SP GR SPEC: 1.02 — SIGNIFICANT CHANGE UP (ref 1–1.03)
UROBILINOGEN FLD QL: 1 MG/DL — SIGNIFICANT CHANGE UP (ref 0.2–1)
WBC # BLD: 13.47 K/UL — HIGH (ref 3.8–10.5)
WBC # FLD AUTO: 13.47 K/UL — HIGH (ref 3.8–10.5)
WBC UR QL: 12 /HPF — HIGH (ref 0–5)

## 2025-07-04 PROCEDURE — 71260 CT THORAX DX C+: CPT

## 2025-07-04 PROCEDURE — 74177 CT ABD & PELVIS W/CONTRAST: CPT | Mod: 26

## 2025-07-04 PROCEDURE — 71260 CT THORAX DX C+: CPT | Mod: 26

## 2025-07-04 PROCEDURE — 74177 CT ABD & PELVIS W/CONTRAST: CPT

## 2025-07-04 PROCEDURE — 80053 COMPREHEN METABOLIC PANEL: CPT

## 2025-07-04 PROCEDURE — 84702 CHORIONIC GONADOTROPIN TEST: CPT

## 2025-07-04 PROCEDURE — 83605 ASSAY OF LACTIC ACID: CPT

## 2025-07-04 PROCEDURE — 99291 CRITICAL CARE FIRST HOUR: CPT

## 2025-07-04 PROCEDURE — 93010 ELECTROCARDIOGRAM REPORT: CPT

## 2025-07-04 PROCEDURE — 81001 URINALYSIS AUTO W/SCOPE: CPT

## 2025-07-04 PROCEDURE — 85610 PROTHROMBIN TIME: CPT

## 2025-07-04 PROCEDURE — 36415 COLL VENOUS BLD VENIPUNCTURE: CPT

## 2025-07-04 PROCEDURE — 99283 EMERGENCY DEPT VISIT LOW MDM: CPT

## 2025-07-04 PROCEDURE — 99223 1ST HOSP IP/OBS HIGH 75: CPT

## 2025-07-04 PROCEDURE — 85730 THROMBOPLASTIN TIME PARTIAL: CPT

## 2025-07-04 PROCEDURE — 85025 COMPLETE CBC W/AUTO DIFF WBC: CPT

## 2025-07-04 RX ORDER — PIPERACILLIN-TAZO-DEXTROSE,ISO 3.375G/5
3.38 IV SOLUTION, PIGGYBACK PREMIX FROZEN(ML) INTRAVENOUS ONCE
Refills: 0 | Status: COMPLETED | OUTPATIENT
Start: 2025-07-04 | End: 2025-07-04

## 2025-07-04 RX ORDER — VANCOMYCIN HCL IN 5 % DEXTROSE 1.5G/250ML
1000 PLASTIC BAG, INJECTION (ML) INTRAVENOUS ONCE
Refills: 0 | Status: COMPLETED | OUTPATIENT
Start: 2025-07-04 | End: 2025-07-04

## 2025-07-04 RX ORDER — PIPERACILLIN-TAZO-DEXTROSE,ISO 3.375G/5
3.38 IV SOLUTION, PIGGYBACK PREMIX FROZEN(ML) INTRAVENOUS EVERY 8 HOURS
Refills: 0 | Status: DISCONTINUED | OUTPATIENT
Start: 2025-07-05 | End: 2025-07-07

## 2025-07-04 RX ORDER — ACETAMINOPHEN 500 MG/5ML
650 LIQUID (ML) ORAL EVERY 6 HOURS
Refills: 0 | Status: DISCONTINUED | OUTPATIENT
Start: 2025-07-04 | End: 2025-07-12

## 2025-07-04 RX ORDER — VANCOMYCIN HCL IN 5 % DEXTROSE 1.5G/250ML
1000 PLASTIC BAG, INJECTION (ML) INTRAVENOUS EVERY 8 HOURS
Refills: 0 | Status: DISCONTINUED | OUTPATIENT
Start: 2025-07-04 | End: 2025-07-05

## 2025-07-04 RX ADMIN — Medication 200 GRAM(S): at 21:37

## 2025-07-04 RX ADMIN — Medication 250 MILLIGRAM(S): at 22:24

## 2025-07-04 RX ADMIN — Medication 1000 MILLILITER(S): at 21:36

## 2025-07-04 NOTE — H&P ADULT - NSHPPHYSICALEXAM_GEN_ALL_CORE
Vital Signs Last 24 Hrs  T(C): 36.6 (04 Jul 2025 19:19), Max: 36.6 (04 Jul 2025 19:19)  T(F): 97.9 (04 Jul 2025 19:19), Max: 97.9 (04 Jul 2025 19:19)  HR: 101 (04 Jul 2025 19:19) (101 - 101)  BP: 106/66 (04 Jul 2025 19:19) (106/66 - 106/66)  BP(mean): --  RR: 18 (04 Jul 2025 19:19) (18 - 18)  SpO2: 97% (04 Jul 2025 19:19) (97% - 97%)    Parameters below as of 04 Jul 2025 19:19  Patient On (Oxygen Delivery Method): room air      Daily Height in cm: 154.94 (04 Jul 2025 19:19)    Daily   CAPILLARY BLOOD GLUCOSE        I&O's Summary      GENERAL: NAD  HEAD:  Normocephalic  EYES: EOMI, PERRLA, conjunctiva and sclera clear  ENMT: No tonsillar erythema, exudates, or enlargement; Moist mucous membranes, No lesions  NECK: Supple, No JVD, no bruit, normal thyroid  NERVOUS SYSTEM:  Alert & Oriented X3, Good concentration; grossly  Motor Strength 5/5 B/L upper and lower extremities; DTRs 2+ intact and symmetric  CHEST/LUNG: Clear to auscultation bilaterally; No rales, rhonchi, wheezing, or rubs  HEART: Regular rate and rhythm; +systolic  murmurs, no rubs, or gallops  ABDOMEN: Soft, Nontender, Nondistended; Bowel sounds present  EXTREMITIES:  2+ Peripheral Pulses, No clubbing, cyanosis, or edema  LYMPH: No lymphadenopathy noted  SKIN: No rashes or lesions  Breasts: R breast min discomfort. L breast with diffuse erythema and warmth, no

## 2025-07-04 NOTE — ED PROVIDER NOTE - OBJECTIVE STATEMENT
49-year-old female past medical history of breast cancer status post bilateral mastectomy with TRAM flaps done on June 17, 2025 presenting after her drains were removed on the left side this past Wednesday.  Since that point time is a increasing swelling and pressure of the left breast.  Has noticed some redness and warmth as well.  There was concern from her doctors whether or not this was a hematoma versus a infection.  She was started on Keflex orally but symptoms have continued to get worse.  There are no systemic fevers.  No other complaints at the current time

## 2025-07-04 NOTE — ED PROVIDER NOTE - PROGRESS NOTE DETAILS
Patient CT scan demonstrates that there is a possibly hematoma versus trauma that may or may not be infected.  The very least cellulitis.  It is failing outpatient management.  Discussion with the plastic surgeon I will give broad-spectrum antibiotics and admit to the hospital at this time.  patient does have a

## 2025-07-04 NOTE — ED PROVIDER NOTE - CLINICAL SUMMARY MEDICAL DECISION MAKING FREE TEXT BOX
43-year-old female past medical history of breast cancer status post bilateral ectomy with TRAM flaps done on June 17 presenting with swelling pressure redness and warmth of the left breast.  Differential includes but is not limited to postoperative swelling, hematoma, abscess.  Patient will require imaging to further assess the extent of what is present.  The patient just took the Keflex prior to arrival.  I then going to wait for the imaging to see whether not this is an abscess or hematoma before giving her more broad-spectrum antibiotics.  If she had significant white count she would not make SIRS criteria and I will start antibiotics sooner.  Plastic surgery will be consulted as soon as the imaging has been obtained

## 2025-07-04 NOTE — ED PROVIDER NOTE - PHYSICAL EXAMINATION
Vitals: I have reviewed the patients vital signs  General: nontoxic appearing  HEENT: Atraumatic, normocephalic, airway patent  Eyes: EOMI, tracking appropriately  Neck: no tracheal deviation  Chest/Lungs: no trauma, symmetric chest rise, speaking in complete bilateral postsurgical changes for mastectomy and flap.  The left breast is significantly swollen and tense compared to the right.  There is erythema and warmth surrounding the extent of the breast.  Patient is also tender in the left upper quadrant of the abdomen.  Sentences,  no resp distress  Heart: skin and extremities well perfused, tachycardic rate and rhythm  Neuro: A+Ox3, appears non focal  MSK: no deformities  Skin: no cyanosis, no jaundice   Psych:  Normal mood and affect

## 2025-07-04 NOTE — ED PROVIDER NOTE - CRITICAL CARE ATTENDING CONTRIBUTION TO CARE
Upon my evaluation, this patient had a high probability of imminent or life-threatening deterioration due to post op infection, which required my direct attention, intervention, and personal management.  The patient has a  medical condition that impairs one or more vital organ systems.  Frequent personal assessment and adjustment of medical interventions was performed.      I have personally provided 35 minutes of critical care time exclusive of time spent on separately billable procedures. Time includes review of laboratory data, radiology results, discussion with consultants, patient and family; monitoring for potential decompensation, as well as time spent retrieving data and reviewing the chart and documenting the visit. Interventions were performed as documented above.

## 2025-07-04 NOTE — H&P ADULT - HISTORY OF PRESENT ILLNESS
49 F hx of nephrolithiasis, L breast cancer s/p bl mastectomy and ADOLFO on 6/17/2025. s/p 2 drains removed from R breast on Monday and 2 drains removed from L breast on Wednesday. Subsequently the L breast started to become painful and swollen after the drains were removed. She had returned on yesterday and re-evaluated and dopplers were reportedly fine and started on Keflex for L breast infection. She continued to have persistent pain and swelling and advised ED visit. Dened any fevers or chills. +sweats.   No cough, SOB, NVD, dysuria, melena.   did take toradol for pain control.    s/p CT chest :   At the lateral aspect of the left breast there is a 3.3 x 2.5 x 4.9 cm   collection with surrounding fat stranding, likely postoperative   hematoma/seroma, superimposed infection is not definitively excluded.    At the medial aspect of the left breast there is a small amount of   subcutaneous fluid without definite organization.   49 F hx of nephrolithiasis, L breast cancer s/p bl mastectomy and ADOLFO on 6/17/2025. s/p 2 drains removed from R breast on Monday and 2 drains removed from L breast on Wednesday. Subsequently the L breast started to become painful and swollen after the drains were removed. She had returned on yesterday and re-evaluated and dopplers were reportedly fine and started on Keflex for L breast infection. She continued to have persistent pain and swelling and advised ED visit. Dened any fevers or chills. +sweats.   No cough, SOB, NVD, dysuria, melena.   did take toradol for pain control.  on lovenox 40mg qhs for 30 days post sx.     s/p CT chest :   At the lateral aspect of the left breast there is a 3.3 x 2.5 x 4.9 cm   collection with surrounding fat stranding, likely postoperative   hematoma/seroma, superimposed infection is not definitively excluded.    At the medial aspect of the left breast there is a small amount of   subcutaneous fluid without definite organization.

## 2025-07-04 NOTE — H&P ADULT - NSHPREVIEWOFSYSTEMS_GEN_ALL_CORE
CONSTITUTIONAL: No fever, weight loss, or fatigue  EYES: No eye pain, visual disturbances, or discharge  ENMT:  No difficulty hearing, tinnitus, vertigo; No sinus or throat pain  NECK: No pain or stiffness  RESPIRATORY: No cough, wheezing, chills or hemoptysis; No shortness of breath. + L breast pain and swelling as per HPI  CARDIOVASCULAR: No chest pain, palpitations, dizziness, or leg swelling  GASTROINTESTINAL: No abdominal or epigastric pain. No nausea, vomiting, or hematemesis; No diarrhea or constipation. No melena or hematochezia.  GENITOURINARY: No dysuria, frequency, hematuria, or incontinence  NEUROLOGICAL: No headaches, memory loss, loss of strength, numbness, or tremors  SKIN: No itching, burning, rashes, or lesions   LYMPH NODES: No enlarged glands  ENDOCRINE: No heat or cold intolerance; No hair loss  MUSCULOSKELETAL: No joint pain or swelling; No muscle, back, or extremity pain  PSYCHIATRIC: No depression, anxiety, mood swings, or difficulty sleeping  HEME/LYMPH: No easy bruising, or bleeding gums  ALLERY AND IMMUNOLOGIC: No hives or eczema    IMPROVE VTE Individual Risk Assessment          RISK                                                          Points  [  ] Previous VTE                                                3  [  ] Thrombophilia                                             2  [  ] Lower limb paralysis                                   2        (unable to hold up >15 seconds)    [ 2 ] Current Cancer                                             2         (within 6 months)  [  ] Immobilization > 24 hrs                              1  [  ] ICU/CCU stay > 24 hours                             1  [  ] Age > 60                                                         1    IMPROVE VTE Score:         [   2      ]    Total Risk Factor Score:    0 - 1:   Consider IPC  >2 - 3:  Thromboprophylaxis required (enoxaparin or SQ heparin)        >4:   High Risk: Thromboprophylaxis required (enoxaparin or SQ heparin), optional add IPC  **If CONTRAINDICATION to enoxaparin or SQ heparin, USE IPCs**

## 2025-07-04 NOTE — H&P ADULT - NSHPLABSRESULTS_GEN_ALL_CORE
7.7    13.47 )-----------( 363      ( 2025 20:15 )             24.2       07-    139  |  104  |  21  ----------------------------<  117[H]  3.4[L]   |  30  |  0.45[L]    Ca    8.9      2025 20:15    TPro  6.7  /  Alb  2.5[L]  /  TBili  0.3  /  DBili  x   /  AST  40  /  ALT  60[H]  /  AlkPhos  80      Lactate, Blood: 0.5 mmol/L ( @ 20:15)       LIVER FUNCTIONS - ( 2025 20:15 )  Alb: 2.5 g/dL / Pro: 6.7 g/dL / ALK PHOS: 80 U/L / ALT: 60 U/L / AST: 40 U/L / GGT: x               PT/INR - ( 2025 20:15 )   PT: 12.3 sec;   INR: 1.04 ratio         PTT - ( 2025 20:15 )  PTT:29.0 sec          Urinalysis Basic - ( 2025 21:10 )    Color: Yellow / Appearance: Clear / S.024 / pH: x  Gluc: x / Ketone: x  / Bili: Negative / Urobili: 1.0 mg/dL   Blood: x / Protein: 30 mg/dL / Nitrite: Negative   Leuk Esterase: Small / RBC: 1 /HPF / WBC 12 /HPF   Sq Epi: x / Non Sq Epi: x / Bacteria: Few /HPF        Urinalysis with Rflx Culture (collected 25 @ 21:10)      CAPILLARY BLOOD GLUCOSE            EKG: personally rev. NSR at 93bpm no acute st changes      rad< from: CT Chest abd pelvis  w/ IV Cont (25 @ 21:16) >    IMPRESSION:  Sequelae of bilateral mastectomies with flap reconstruction.    At the lateral aspect of the left breast there is a 3.3 x 2.5 x 4.9 cm   collection with surrounding fat stranding, likely postoperative   hematoma/seroma, superimposed infection is not definitively excluded.    At the medial aspect of the left breast there is a small amount of   subcutaneous fluid without definite organization.    < end of copied text >

## 2025-07-05 DIAGNOSIS — Z90.13 ACQUIRED ABSENCE OF BILATERAL BREASTS AND NIPPLES: Chronic | ICD-10-CM

## 2025-07-05 DIAGNOSIS — Z98.890 OTHER SPECIFIED POSTPROCEDURAL STATES: Chronic | ICD-10-CM

## 2025-07-05 LAB
ABO RH CONFIRMATION: SIGNIFICANT CHANGE UP
ALBUMIN SERPL ELPH-MCNC: 2.3 G/DL — LOW (ref 3.3–5)
ALP SERPL-CCNC: 78 U/L — SIGNIFICANT CHANGE UP (ref 40–120)
ALT FLD-CCNC: 49 U/L — HIGH (ref 10–45)
ANION GAP SERPL CALC-SCNC: 9 MMOL/L — SIGNIFICANT CHANGE UP (ref 5–17)
AST SERPL-CCNC: 26 U/L — SIGNIFICANT CHANGE UP (ref 10–40)
BILIRUB SERPL-MCNC: 0.9 MG/DL — SIGNIFICANT CHANGE UP (ref 0.2–1.2)
BLD GP AB SCN SERPL QL: SIGNIFICANT CHANGE UP
BUN SERPL-MCNC: 9 MG/DL — SIGNIFICANT CHANGE UP (ref 7–23)
CALCIUM SERPL-MCNC: 8.8 MG/DL — SIGNIFICANT CHANGE UP (ref 8.4–10.5)
CHLORIDE SERPL-SCNC: 106 MMOL/L — SIGNIFICANT CHANGE UP (ref 96–108)
CO2 SERPL-SCNC: 25 MMOL/L — SIGNIFICANT CHANGE UP (ref 22–31)
CREAT SERPL-MCNC: 0.44 MG/DL — LOW (ref 0.5–1.3)
EGFR: 119 ML/MIN/1.73M2 — SIGNIFICANT CHANGE UP
EGFR: 119 ML/MIN/1.73M2 — SIGNIFICANT CHANGE UP
GLUCOSE SERPL-MCNC: 110 MG/DL — HIGH (ref 70–99)
HCT VFR BLD CALC: 21.5 % — LOW (ref 34.5–45)
HCT VFR BLD CALC: 26.1 % — LOW (ref 34.5–45)
HGB BLD-MCNC: 6.9 G/DL — CRITICAL LOW (ref 11.5–15.5)
HGB BLD-MCNC: 8.5 G/DL — LOW (ref 11.5–15.5)
MAGNESIUM SERPL-MCNC: 1.9 MG/DL — SIGNIFICANT CHANGE UP (ref 1.6–2.6)
MCHC RBC-ENTMCNC: 26 PG — LOW (ref 27–34)
MCHC RBC-ENTMCNC: 26.5 PG — LOW (ref 27–34)
MCHC RBC-ENTMCNC: 32.1 G/DL — SIGNIFICANT CHANGE UP (ref 32–36)
MCHC RBC-ENTMCNC: 32.6 G/DL — SIGNIFICANT CHANGE UP (ref 32–36)
MCV RBC AUTO: 81.1 FL — SIGNIFICANT CHANGE UP (ref 80–100)
MCV RBC AUTO: 81.3 FL — SIGNIFICANT CHANGE UP (ref 80–100)
NRBC BLD AUTO-RTO: 0 /100 WBCS — SIGNIFICANT CHANGE UP (ref 0–0)
NRBC BLD AUTO-RTO: 0 /100 WBCS — SIGNIFICANT CHANGE UP (ref 0–0)
PLATELET # BLD AUTO: 313 K/UL — SIGNIFICANT CHANGE UP (ref 150–400)
PLATELET # BLD AUTO: 326 K/UL — SIGNIFICANT CHANGE UP (ref 150–400)
POTASSIUM SERPL-MCNC: 3.4 MMOL/L — LOW (ref 3.5–5.3)
POTASSIUM SERPL-SCNC: 3.4 MMOL/L — LOW (ref 3.5–5.3)
PROT SERPL-MCNC: 6.3 G/DL — SIGNIFICANT CHANGE UP (ref 6–8.3)
RBC # BLD: 2.65 M/UL — LOW (ref 3.8–5.2)
RBC # BLD: 3.21 M/UL — LOW (ref 3.8–5.2)
RBC # FLD: 14.2 % — SIGNIFICANT CHANGE UP (ref 10.3–14.5)
RBC # FLD: 14.3 % — SIGNIFICANT CHANGE UP (ref 10.3–14.5)
SODIUM SERPL-SCNC: 140 MMOL/L — SIGNIFICANT CHANGE UP (ref 135–145)
VANCOMYCIN TROUGH SERPL-MCNC: 6.8 UG/ML — LOW (ref 10–20)
WBC # BLD: 12.3 K/UL — HIGH (ref 3.8–10.5)
WBC # BLD: 12.94 K/UL — HIGH (ref 3.8–10.5)
WBC # FLD AUTO: 12.3 K/UL — HIGH (ref 3.8–10.5)
WBC # FLD AUTO: 12.94 K/UL — HIGH (ref 3.8–10.5)

## 2025-07-05 PROCEDURE — 86850 RBC ANTIBODY SCREEN: CPT

## 2025-07-05 PROCEDURE — 87040 BLOOD CULTURE FOR BACTERIA: CPT

## 2025-07-05 PROCEDURE — 71260 CT THORAX DX C+: CPT

## 2025-07-05 PROCEDURE — 81001 URINALYSIS AUTO W/SCOPE: CPT

## 2025-07-05 PROCEDURE — 86901 BLOOD TYPING SEROLOGIC RH(D): CPT

## 2025-07-05 PROCEDURE — 74177 CT ABD & PELVIS W/CONTRAST: CPT

## 2025-07-05 PROCEDURE — 85730 THROMBOPLASTIN TIME PARTIAL: CPT

## 2025-07-05 PROCEDURE — 83735 ASSAY OF MAGNESIUM: CPT

## 2025-07-05 PROCEDURE — 99233 SBSQ HOSP IP/OBS HIGH 50: CPT

## 2025-07-05 PROCEDURE — 85025 COMPLETE CBC W/AUTO DIFF WBC: CPT

## 2025-07-05 PROCEDURE — 86900 BLOOD TYPING SEROLOGIC ABO: CPT

## 2025-07-05 PROCEDURE — 87086 URINE CULTURE/COLONY COUNT: CPT

## 2025-07-05 PROCEDURE — 80202 ASSAY OF VANCOMYCIN: CPT

## 2025-07-05 PROCEDURE — 85027 COMPLETE CBC AUTOMATED: CPT

## 2025-07-05 PROCEDURE — 87070 CULTURE OTHR SPECIMN AEROBIC: CPT

## 2025-07-05 PROCEDURE — 36415 COLL VENOUS BLD VENIPUNCTURE: CPT

## 2025-07-05 PROCEDURE — 84702 CHORIONIC GONADOTROPIN TEST: CPT

## 2025-07-05 PROCEDURE — 99231 SBSQ HOSP IP/OBS SF/LOW 25: CPT

## 2025-07-05 PROCEDURE — 83605 ASSAY OF LACTIC ACID: CPT

## 2025-07-05 PROCEDURE — 93005 ELECTROCARDIOGRAM TRACING: CPT

## 2025-07-05 PROCEDURE — 80053 COMPREHEN METABOLIC PANEL: CPT

## 2025-07-05 PROCEDURE — 85610 PROTHROMBIN TIME: CPT

## 2025-07-05 PROCEDURE — 86923 COMPATIBILITY TEST ELECTRIC: CPT

## 2025-07-05 RX ORDER — VANCOMYCIN HCL IN 5 % DEXTROSE 1.5G/250ML
1250 PLASTIC BAG, INJECTION (ML) INTRAVENOUS EVERY 8 HOURS
Refills: 0 | Status: DISCONTINUED | OUTPATIENT
Start: 2025-07-05 | End: 2025-07-06

## 2025-07-05 RX ORDER — SODIUM CHLORIDE 9 G/1000ML
1000 INJECTION, SOLUTION INTRAVENOUS
Refills: 0 | Status: DISCONTINUED | OUTPATIENT
Start: 2025-07-05 | End: 2025-07-07

## 2025-07-05 RX ADMIN — Medication 25 GRAM(S): at 06:53

## 2025-07-05 RX ADMIN — Medication 166.67 MILLIGRAM(S): at 21:27

## 2025-07-05 RX ADMIN — Medication 25 GRAM(S): at 23:02

## 2025-07-05 RX ADMIN — Medication 40 MILLIEQUIVALENT(S): at 12:54

## 2025-07-05 RX ADMIN — Medication 650 MILLIGRAM(S): at 18:07

## 2025-07-05 RX ADMIN — Medication 250 MILLIGRAM(S): at 05:53

## 2025-07-05 RX ADMIN — Medication 250 MILLIGRAM(S): at 12:53

## 2025-07-05 RX ADMIN — Medication 25 GRAM(S): at 14:32

## 2025-07-05 RX ADMIN — Medication 40 MILLIGRAM(S): at 06:53

## 2025-07-05 NOTE — PROGRESS NOTE ADULT - ASSESSMENT
Covering for Dr. Domo German.   Patient > 2.5 wks s/p bilateral mastectomy and ADOLFO flap, now with left breast abscess and cellulitis.   Abscess drained at bedside this AM and cultures sent.     continue broad spectrum IV abx  serial exams  pain control  await culture results  patient understands that if symptoms do not improve with Abx alone then she will require washout in the OR  NPOpMN for possible washout tomorrow.   Will re-evaluate in the AM  Discussed with Dr. German

## 2025-07-05 NOTE — PATIENT PROFILE ADULT - IS PATIENT POST-MENOPAUSAL?
Case Management Assessment  Initial Evaluation    Date/Time of Evaluation: 8/23/2023 4:02 PM  Assessment Completed by: Shiva Alford RN    If patient is discharged prior to next notation, then this note serves as note for discharge by case management. Patient Name: Luh West                   YOB: 1948  Diagnosis: Septicemia ([address]) [A41.9]  Decreased appetite [R63.0]  ERIBERTO (acute kidney injury) ([address]) [N17.9]  Acute cystitis with hematuria [N30.01]                   Date / Time: 8/21/2023  4:05 PM    Patient Admission Status: Inpatient   Readmission Risk (Low < 19, Mod (19-27), High > 27): Readmission Risk Score: 16.8    Current PCP: Leticia Garcia MD  PCP verified by CM? (P) Yes    Chart Reviewed: Yes      History Provided by: (P) Patient, Significant Other  Patient Orientation: (P) Alert and Oriented, Person, Place, Situation    Patient Cognition: (P) Alert    Hospitalization in the last 30 days (Readmission):  No    If yes, Readmission Assessment in  Navigator will be completed.     Advance Directives:      Code Status: Full Code   Patient's Primary Decision Maker is: (P) Legal Next of Kin    Primary Decision Maker: Jana Arango - Spouse - [phone]    Secondary Decision Maker: Rich Dakins Child - [phone]    Discharge Planning:    Patient lives with: (P) Spouse/Significant Other Type of Home: (P) House  Primary Care Giver: (P) Self  Patient Support Systems include: (P) Spouse/Significant Other, Children   Current Financial resources: (P) None  Current community resources: (P) None  Current services prior to admission: (P) None            Current DME:              Type of Home Care services:  (P) None    ADLS  Prior functional level: (P) Independent in ADLs/IADLs, Assistance with the following:, Housework, Cooking  Current functional level: (P) Independent in ADLs/IADLs, Housework, Cooking    PT AM-PAC: 24 /24  OT AM-PAC: 24 /24    Family can provide assistance at DC: (P)
no

## 2025-07-05 NOTE — PROGRESS NOTE ADULT - ASSESSMENT
49 F hx of nephrolithiasis, L breast cancer s/p bl mastectomy and ADOLFO on 6/17/2025 with L breast cellulitis with L breast collection likely hematoma/seroma per CT and acute anemia    # L breast cellulitis failed outpt antibxs meeting SIRS for sepsis (tachy, +leukocytosis)  cont IV Vanco and Zosyn    #L breast seroma/hematoma  likely in the setting of recent drop in H/H.  trend hgb  active T/S  Transfusion consent already in chart if needed.   hold lovenox for now.   serial exams  was on NSAIDs, check stool guaiac to be complete.    #DVT/GI proph  SCDs, PPI    Jason ALBRIGHT rev  D/W Dr [facility] and Dr [facility]   48 y/o female w/ PMH of nephrolithiasis, left breast cancer s/p B/L mastectomy and ADOLFO on 6/17/2025 with left breast cellulitis and collection likely hematoma/seroma per CT and acute anemia.    # Left breast cellulitis failed outpt antibxs meeting SIRS for sepsis (tachy, +leukocytosis)  cont IV Vanco and Zosyn  s/p s/p of purulent fluid was aspirated from the lateral chest pocket  Patient > 2.5 wks s/p bilateral mastectomy and ADOLFO flap, now with left breast abscess and cellulitis.   Abscess drained at bedside this AM and cultures sent.   continue broad spectrum IV abx  serial exams  pain control  await culture results  patient understands that if symptoms do not improve with Abx alone then she will require washout in the OR  NPOpMN for possible washout tomorrow.   Will re-evaluate in the AM  Discussed with Dr. German    #L breast seroma/hematoma  likely in the setting of recent drop in H/H.  trend hgb s/p 1U PRBC with appropriate response  active T/S  hold lovenox for now.   serial exams  was on NSAIDs, check stool guaiac to be complete.    #DVT/GI proph  SCDs, PPI   48 y/o female w/ PMH of nephrolithiasis, left breast cancer s/p B/L mastectomy and ADOLFO on 6/17/2025 with left breast cellulitis and collection likely hematoma/seroma per CT and acute anemia.    # Sepsis (present on admission; , WBC 13.4)  # Left breast abscess and cellulitis  - Pt. is > 2.5 weeks s/p bilateral mastectomy and ADOLFO flap  - CT C/A/P: lateral left breast - 3.3 x 2.5 x 4.9 cm collection with surrounding fat stranding, likely postop hematoma/seroma, superimposed infection is not definitively excluded. At the medial aspect of the left breast there is a small amount of subcutaneous fluid without definite organization.  - 7/6 s/p of purulent fluid was aspirated from the lateral chest pocket by surgical PA > cultures sent  - f/u wound, blood and urine cultures  - c/w zosyn and vanco  - c/w tylenol as needed for pain  - c/w IVFs   - NPO after MN for possible washout tomorrow, surgery to re-eval in AM  - ID consult Monday    # Left breast seroma/hematoma  # Anemia  - Hgb 6.9 > 1U PRBC now 8.5  - Keep active T+S  - Trend CBC  - Pt. was on NSAIDs but very low suspicious for GIB > BID protonix d/c'd and FOB d/c'd > will reorder if melena present or pt. has other s/'s of GIB    # VTE ppx: SCDs for now    # GOC: Full code

## 2025-07-05 NOTE — PROGRESS NOTE ADULT - SUBJECTIVE AND OBJECTIVE BOX
Patient is a 49y old female who presents with a chief complaint of L breast cellulitis (05 Jul 2025 10:49)    Patient seen and examined at bedside. No overnight events reported.     ALLERGIES:  No Known Allergies    MEDICATIONS  (STANDING):  lactated ringers. 1000 milliLiter(s) (75 mL/Hr) IV Continuous <Continuous>  pantoprazole  Injectable 40 milliGRAM(s) IV Push every 12 hours  piperacillin/tazobactam IVPB.. 3.375 Gram(s) IV Intermittent every 8 hours  vancomycin  IVPB 1000 milliGRAM(s) IV Intermittent every 8 hours    MEDICATIONS  (PRN):  acetaminophen     Tablet .. 650 milliGRAM(s) Oral every 6 hours PRN Mild Pain (1 - 3)    Vital Signs Last 24 Hrs  T(F): 98.3 (05 Jul 2025 08:40), Max: 98.7 (05 Jul 2025 01:06)  HR: 80 (05 Jul 2025 08:40) (80 - 101)  BP: 94/56 (05 Jul 2025 08:40) (91/53 - 106/66)  RR: 16 (05 Jul 2025 08:40) (15 - 18)  SpO2: 96% (05 Jul 2025 08:40) (96% - 97%)  I&O's Summary    PHYSICAL EXAM:  General: NAD, A/O x 3  ENT: No gross hearing impairment, Moist mucous membranes, no thrush  Neck: Supple, No JVD  Lungs: Clear to auscultation bilaterally, good air entry, non-labored breathing  Cardio: RRR, S1/S2, No murmur  Abdomen: Soft, Nontender, Nondistended; Bowel sounds present  Extremities: No calf tenderness, No cyanosis, No pitting edema  Psych: Appropriate mood and affect    LABS:                        8.5    12.94 )-----------( 326      ( 05 Jul 2025 11:30 )             26.1     07-05    140  |  106  |  9   ----------------------------<  110  3.4   |  25  |  0.44    Ca    8.8      05 Jul 2025 11:30  Mg     1.9     07-05    TPro  6.3  /  Alb  2.3  /  TBili  0.9  /  DBili  x   /  AST  26  /  ALT  49  /  AlkPhos  78  07-05    PT/INR - ( 04 Jul 2025 20:15 )   PT: 12.3 sec;   INR: 1.04 ratio    PTT - ( 04 Jul 2025 20:15 )  PTT:29.0 sec    Lactate, Blood: 0.5 mmol/L (07-04 @ 20:15)    Urinalysis Basic - ( 05 Jul 2025 11:30 )    Color: x / Appearance: x / SG: x / pH: x  Gluc: 110 mg/dL / Ketone: x  / Bili: x / Urobili: x   Blood: x / Protein: x / Nitrite: x   Leuk Esterase: x / RBC: x / WBC x   Sq Epi: x / Non Sq Epi: x / Bacteria: x

## 2025-07-05 NOTE — PROGRESS NOTE ADULT - SUBJECTIVE AND OBJECTIVE BOX
Patient seen and examined at bedside. Her sister is here with her. Patient received one unit pRBC this AM.   She remains afebrile. She reports that the left breast feel foreign and very swollen. WBC trending down.     Vital Signs Last 24 Hrs  T(C): 36.8 (05 Jul 2025 08:40), Max: 37.1 (05 Jul 2025 01:06)  T(F): 98.3 (05 Jul 2025 08:40), Max: 98.7 (05 Jul 2025 01:06)  HR: 80 (05 Jul 2025 08:40) (80 - 101)  BP: 94/56 (05 Jul 2025 08:40) (91/53 - 106/66)  BP(mean): --  RR: 16 (05 Jul 2025 08:40) (15 - 18)  SpO2: 96% (05 Jul 2025 08:40) (96% - 97%)    Parameters below as of 05 Jul 2025 08:40  Patient On (Oxygen Delivery Method): room air                          6.9    12.30 )-----------( 313      ( 05 Jul 2025 00:36 )             21.5       On Exam:  Stable left breast cellulitis  Skin paddle pink, good cap refill, strong arterial doppler signal      The left lateral breast was prepped with alcohol and betadine  Using sterile technique, >10cc of purulent fluid was aspirated from the lateral chest pocket  clean dressing applied

## 2025-07-06 LAB
ANION GAP SERPL CALC-SCNC: 6 MMOL/L — SIGNIFICANT CHANGE UP (ref 5–17)
BUN SERPL-MCNC: 13 MG/DL — SIGNIFICANT CHANGE UP (ref 7–23)
CALCIUM SERPL-MCNC: 8.7 MG/DL — SIGNIFICANT CHANGE UP (ref 8.4–10.5)
CHLORIDE SERPL-SCNC: 105 MMOL/L — SIGNIFICANT CHANGE UP (ref 96–108)
CO2 SERPL-SCNC: 28 MMOL/L — SIGNIFICANT CHANGE UP (ref 22–31)
CREAT SERPL-MCNC: 0.48 MG/DL — LOW (ref 0.5–1.3)
CULTURE RESULTS: NO GROWTH — SIGNIFICANT CHANGE UP
EGFR: 117 ML/MIN/1.73M2 — SIGNIFICANT CHANGE UP
EGFR: 117 ML/MIN/1.73M2 — SIGNIFICANT CHANGE UP
GLUCOSE SERPL-MCNC: 110 MG/DL — HIGH (ref 70–99)
GRAM STN FLD: ABNORMAL
GRAM STN FLD: ABNORMAL
HCG SERPL-ACNC: <0.6 MIU/ML — SIGNIFICANT CHANGE UP
HCT VFR BLD CALC: 26 % — LOW (ref 34.5–45)
HGB BLD-MCNC: 8.4 G/DL — LOW (ref 11.5–15.5)
MAGNESIUM SERPL-MCNC: 1.9 MG/DL — SIGNIFICANT CHANGE UP (ref 1.6–2.6)
MCHC RBC-ENTMCNC: 26.6 PG — LOW (ref 27–34)
MCHC RBC-ENTMCNC: 32.3 G/DL — SIGNIFICANT CHANGE UP (ref 32–36)
MCV RBC AUTO: 82.3 FL — SIGNIFICANT CHANGE UP (ref 80–100)
NRBC BLD AUTO-RTO: 0 /100 WBCS — SIGNIFICANT CHANGE UP (ref 0–0)
PLATELET # BLD AUTO: 313 K/UL — SIGNIFICANT CHANGE UP (ref 150–400)
POTASSIUM SERPL-MCNC: 3.6 MMOL/L — SIGNIFICANT CHANGE UP (ref 3.5–5.3)
POTASSIUM SERPL-SCNC: 3.6 MMOL/L — SIGNIFICANT CHANGE UP (ref 3.5–5.3)
RBC # BLD: 3.16 M/UL — LOW (ref 3.8–5.2)
RBC # FLD: 14.5 % — SIGNIFICANT CHANGE UP (ref 10.3–14.5)
SODIUM SERPL-SCNC: 139 MMOL/L — SIGNIFICANT CHANGE UP (ref 135–145)
SPECIMEN SOURCE: SIGNIFICANT CHANGE UP
VANCOMYCIN TROUGH SERPL-MCNC: 12.1 UG/ML — SIGNIFICANT CHANGE UP (ref 10–20)
WBC # BLD: 9.06 K/UL — SIGNIFICANT CHANGE UP (ref 3.8–10.5)
WBC # FLD AUTO: 9.06 K/UL — SIGNIFICANT CHANGE UP (ref 3.8–10.5)

## 2025-07-06 PROCEDURE — 87070 CULTURE OTHR SPECIMN AEROBIC: CPT

## 2025-07-06 PROCEDURE — 86923 COMPATIBILITY TEST ELECTRIC: CPT

## 2025-07-06 PROCEDURE — 74177 CT ABD & PELVIS W/CONTRAST: CPT

## 2025-07-06 PROCEDURE — 80048 BASIC METABOLIC PNL TOTAL CA: CPT

## 2025-07-06 PROCEDURE — 80053 COMPREHEN METABOLIC PANEL: CPT

## 2025-07-06 PROCEDURE — 93005 ELECTROCARDIOGRAM TRACING: CPT

## 2025-07-06 PROCEDURE — 85025 COMPLETE CBC W/AUTO DIFF WBC: CPT

## 2025-07-06 PROCEDURE — 81001 URINALYSIS AUTO W/SCOPE: CPT

## 2025-07-06 PROCEDURE — 85730 THROMBOPLASTIN TIME PARTIAL: CPT

## 2025-07-06 PROCEDURE — 86850 RBC ANTIBODY SCREEN: CPT

## 2025-07-06 PROCEDURE — 99232 SBSQ HOSP IP/OBS MODERATE 35: CPT

## 2025-07-06 PROCEDURE — 83605 ASSAY OF LACTIC ACID: CPT

## 2025-07-06 PROCEDURE — 87075 CULTR BACTERIA EXCEPT BLOOD: CPT

## 2025-07-06 PROCEDURE — 80202 ASSAY OF VANCOMYCIN: CPT

## 2025-07-06 PROCEDURE — 10061 I&D ABSCESS COMP/MULTIPLE: CPT

## 2025-07-06 PROCEDURE — 87077 CULTURE AEROBIC IDENTIFY: CPT

## 2025-07-06 PROCEDURE — 87086 URINE CULTURE/COLONY COUNT: CPT

## 2025-07-06 PROCEDURE — 87040 BLOOD CULTURE FOR BACTERIA: CPT

## 2025-07-06 PROCEDURE — 85027 COMPLETE CBC AUTOMATED: CPT

## 2025-07-06 PROCEDURE — 86900 BLOOD TYPING SEROLOGIC ABO: CPT

## 2025-07-06 PROCEDURE — 86901 BLOOD TYPING SEROLOGIC RH(D): CPT

## 2025-07-06 PROCEDURE — 36415 COLL VENOUS BLD VENIPUNCTURE: CPT

## 2025-07-06 PROCEDURE — 71260 CT THORAX DX C+: CPT

## 2025-07-06 PROCEDURE — 87205 SMEAR GRAM STAIN: CPT

## 2025-07-06 PROCEDURE — 85610 PROTHROMBIN TIME: CPT

## 2025-07-06 PROCEDURE — P9016: CPT

## 2025-07-06 PROCEDURE — 83735 ASSAY OF MAGNESIUM: CPT

## 2025-07-06 PROCEDURE — 84702 CHORIONIC GONADOTROPIN TEST: CPT

## 2025-07-06 RX ORDER — HYDROMORPHONE/SOD CHLOR,ISO/PF 2 MG/10 ML
0.5 SYRINGE (ML) INJECTION
Refills: 0 | Status: DISCONTINUED | OUTPATIENT
Start: 2025-07-06 | End: 2025-07-06

## 2025-07-06 RX ORDER — ONDANSETRON HCL/PF 4 MG/2 ML
4 VIAL (ML) INJECTION ONCE
Refills: 0 | Status: DISCONTINUED | OUTPATIENT
Start: 2025-07-06 | End: 2025-07-06

## 2025-07-06 RX ORDER — HYDROMORPHONE/SOD CHLOR,ISO/PF 2 MG/10 ML
1 SYRINGE (ML) INJECTION
Refills: 0 | Status: DISCONTINUED | OUTPATIENT
Start: 2025-07-06 | End: 2025-07-06

## 2025-07-06 RX ORDER — SODIUM CHLORIDE 9 G/1000ML
1000 INJECTION, SOLUTION INTRAVENOUS
Refills: 0 | Status: DISCONTINUED | OUTPATIENT
Start: 2025-07-06 | End: 2025-07-06

## 2025-07-06 RX ORDER — VANCOMYCIN HCL IN 5 % DEXTROSE 1.5G/250ML
1250 PLASTIC BAG, INJECTION (ML) INTRAVENOUS EVERY 8 HOURS
Refills: 0 | Status: DISCONTINUED | OUTPATIENT
Start: 2025-07-06 | End: 2025-07-07

## 2025-07-06 RX ADMIN — Medication 25 GRAM(S): at 07:14

## 2025-07-06 RX ADMIN — Medication 25 GRAM(S): at 15:26

## 2025-07-06 RX ADMIN — Medication 0.5 MILLIGRAM(S): at 15:39

## 2025-07-06 RX ADMIN — Medication 166.67 MILLIGRAM(S): at 23:54

## 2025-07-06 RX ADMIN — Medication 4 MILLIGRAM(S): at 22:21

## 2025-07-06 RX ADMIN — SODIUM CHLORIDE 75 MILLILITER(S): 9 INJECTION, SOLUTION INTRAVENOUS at 11:15

## 2025-07-06 RX ADMIN — Medication 4 MILLIGRAM(S): at 21:40

## 2025-07-06 RX ADMIN — Medication 166.67 MILLIGRAM(S): at 15:50

## 2025-07-06 RX ADMIN — Medication 166.67 MILLIGRAM(S): at 05:21

## 2025-07-06 NOTE — PROGRESS NOTE ADULT - ASSESSMENT
Patient with left breast infection after HyPAD ADOLFO flap.       OR today for washout, possible removal of alloderm  plan discussed with patient at length and she expressed understanding  d/w Dr. German  NPO

## 2025-07-06 NOTE — PROGRESS NOTE ADULT - SUBJECTIVE AND OBJECTIVE BOX
Patient seen and examined at bedside. She reports drainage from prior drain site overnight. Pain controlled. Gram stain growing gram positive cocci in clusters    Vital Signs Last 24 Hrs  T(C): 36.6 (06 Jul 2025 06:24), Max: 37.4 (05 Jul 2025 13:52)  T(F): 97.9 (06 Jul 2025 06:24), Max: 99.3 (05 Jul 2025 13:52)  HR: 73 (06 Jul 2025 06:24) (73 - 95)  BP: 90/51 (06 Jul 2025 06:24) (89/53 - 97/60)  BP(mean): --  RR: 18 (06 Jul 2025 06:24) (15 - 18)  SpO2: 95% (06 Jul 2025 06:24) (95% - 98%)    Parameters below as of 06 Jul 2025 06:24  Patient On (Oxygen Delivery Method): room air                          8.4    9.06  )-----------( 313      ( 06 Jul 2025 05:40 )             26.0       Exam:  NAD, AxOx3  Left breast swelling with cellulitis, diffuse  palpable fluid collection along lateral border  erythema is extending down the left chest wall  skin paddle pink with good cap refill  right breast C/D/I - exam unchanged

## 2025-07-06 NOTE — PROGRESS NOTE ADULT - SUBJECTIVE AND OBJECTIVE BOX
Patient is a 49y old  Female who presents with a chief complaint of L breast cellulitis (06 Jul 2025 09:00)    Pt. in NAD and aware that plan is for surgical intervention.  Patient seen and examined at bedside. No overnight events reported.     ALLERGIES:  No Known Allergies    MEDICATIONS  (STANDING):  lactated ringers. 1000 milliLiter(s) (75 mL/Hr) IV Continuous <Continuous>  piperacillin/tazobactam IVPB.. 3.375 Gram(s) IV Intermittent every 8 hours  vancomycin  IVPB 1250 milliGRAM(s) IV Intermittent every 8 hours    MEDICATIONS  (PRN):  acetaminophen     Tablet .. 650 milliGRAM(s) Oral every 6 hours PRN Mild Pain (1 - 3)    Vital Signs Last 24 Hrs  T(F): 97.9 (06 Jul 2025 06:24), Max: 99.3 (05 Jul 2025 13:52)  HR: 73 (06 Jul 2025 06:24) (73 - 95)  BP: 90/51 (06 Jul 2025 06:24) (89/53 - 97/60)  RR: 18 (06 Jul 2025 06:24) (15 - 18)  SpO2: 95% (06 Jul 2025 06:24) (95% - 98%)  I&O's Summary    05 Jul 2025 07:01  -  06 Jul 2025 07:00  --------------------------------------------------------  IN: 400 mL / OUT: 0 mL / NET: 400 mL    PHYSICAL EXAM:  General: NAD, A/O x 3  ENT: No gross hearing impairment, Moist mucous membranes, no thrush  Neck: Supple, No JVD  Lungs: Clear to auscultation bilaterally, good air entry, non-labored breathing  Cardio: RRR, S1/S2, No murmur  Abdomen: Soft, Nontender, Nondistended; Bowel sounds present  Extremities: No calf tenderness, No cyanosis, No pitting edema  Psych: Appropriate mood and affect    LABS:                        8.4    9.06  )-----------( 313      ( 06 Jul 2025 05:40 )             26.0     07-06    139  |  105  |  13  ----------------------------<  110  3.6   |  28  |  0.48    Ca    8.7      06 Jul 2025 05:40  Mg     1.9     07-06    TPro  6.3  /  Alb  2.3  /  TBili  0.9  /  DBili  x   /  AST  26  /  ALT  49  /  AlkPhos  78  07-05    PT/INR - ( 04 Jul 2025 20:15 )   PT: 12.3 sec;   INR: 1.04 ratio    PTT - ( 04 Jul 2025 20:15 )  PTT:29.0 sec    Lactate, Blood: 0.5 mmol/L (07-04 @ 20:15)    Urinalysis Basic - ( 06 Jul 2025 05:40 )    Color: x / Appearance: x / SG: x / pH: x  Gluc: 110 mg/dL / Ketone: x  / Bili: x / Urobili: x   Blood: x / Protein: x / Nitrite: x   Leuk Esterase: x / RBC: x / WBC x   Sq Epi: x / Non Sq Epi: x / Bacteria: x    Culture - Abscess with Gram Stain (collected 05 Jul 2025 10:20)  Source: Abscess left breast  Gram Stain (06 Jul 2025 00:45):    Moderate polymorphonuclear leukocytes seen per low power field    Few Gram Positive Cocci in Clusters seen per oil power field    Culture - Blood (collected 04 Jul 2025 20:18)  Source: Blood Blood-Peripheral  Preliminary Report (06 Jul 2025 02:02):    No growth at 24 hours    Culture - Blood (collected 04 Jul 2025 20:15)  Source: Blood Blood-Peripheral  Preliminary Report (06 Jul 2025 02:02):    No growth at 24 hours

## 2025-07-06 NOTE — PROGRESS NOTE ADULT - ASSESSMENT
50 y/o female w/ PMH of nephrolithiasis, left breast cancer s/p B/L mastectomy and ADOLFO on 6/17/2025 with left breast cellulitis and collection likely hematoma/seroma per CT and acute anemia.    # Sepsis (present on admission; , WBC 13.4)  # Left breast abscess and cellulitis  - Pt. is > 2.5 weeks s/p bilateral mastectomy and ADOLFO flap  - CT C/A/P: lateral left breast - 3.3 x 2.5 x 4.9 cm collection with surrounding fat stranding, likely postop hematoma/seroma, superimposed infection is not definitively excluded. At the medial aspect of the left breast there is a small amount of subcutaneous fluid without definite organization.  - 7/6 s/p of purulent fluid was aspirated from the lateral chest pocket by surgical PA > cultures sent > growing gram positive cocci > f/u sensitivities  - 7/4 blood cultures no growth at 24 hours  - f/u urine culture  - c/w zosyn and vanco  - c/w tylenol as needed for pain  - c/w IVFs   - NPO plan for wash out in OR today  - ID consult Monday    # Left breast seroma/hematoma  # Anemia  - Hgb 6.9 > 1U PRBC now stable x2 8.5/8.4  - Keep active T+S  - Trend CBC    # VTE ppx: SCDs for now    # GOC: Full code    Pt. medically optimized for surgery.

## 2025-07-06 NOTE — PROGRESS NOTE ADULT - NUTRITIONAL ASSESSMENT
Diet, NPO after Midnight:      NPO Start Date: 05-Jul-2025,   NPO Start Time: 23:59 (07-05-25 @ 10:54) [Active]  Diet, Regular (07-04-25 @ 23:13) [Active]
Diet, NPO after Midnight:      NPO Start Date: 05-Jul-2025,   NPO Start Time: 23:59 (07-05-25 @ 10:54) [Active]  Diet, Regular (07-04-25 @ 23:13) [Active]

## 2025-07-07 LAB
-  CLINDAMYCIN: SIGNIFICANT CHANGE UP
-  CLINDAMYCIN: SIGNIFICANT CHANGE UP
-  ERYTHROMYCIN: SIGNIFICANT CHANGE UP
-  ERYTHROMYCIN: SIGNIFICANT CHANGE UP
-  GENTAMICIN: SIGNIFICANT CHANGE UP
-  GENTAMICIN: SIGNIFICANT CHANGE UP
-  OXACILLIN: SIGNIFICANT CHANGE UP
-  OXACILLIN: SIGNIFICANT CHANGE UP
-  PENICILLIN: SIGNIFICANT CHANGE UP
-  PENICILLIN: SIGNIFICANT CHANGE UP
-  RIFAMPIN: SIGNIFICANT CHANGE UP
-  RIFAMPIN: SIGNIFICANT CHANGE UP
-  TETRACYCLINE: SIGNIFICANT CHANGE UP
-  TETRACYCLINE: SIGNIFICANT CHANGE UP
-  TRIMETHOPRIM/SULFAMETHOXAZOLE: SIGNIFICANT CHANGE UP
-  TRIMETHOPRIM/SULFAMETHOXAZOLE: SIGNIFICANT CHANGE UP
-  VANCOMYCIN: SIGNIFICANT CHANGE UP
-  VANCOMYCIN: SIGNIFICANT CHANGE UP
ANION GAP SERPL CALC-SCNC: 9 MMOL/L — SIGNIFICANT CHANGE UP (ref 5–17)
BUN SERPL-MCNC: 7 MG/DL — SIGNIFICANT CHANGE UP (ref 7–23)
CALCIUM SERPL-MCNC: 9.5 MG/DL — SIGNIFICANT CHANGE UP (ref 8.4–10.5)
CHLORIDE SERPL-SCNC: 104 MMOL/L — SIGNIFICANT CHANGE UP (ref 96–108)
CO2 SERPL-SCNC: 28 MMOL/L — SIGNIFICANT CHANGE UP (ref 22–31)
CREAT SERPL-MCNC: 0.43 MG/DL — LOW (ref 0.5–1.3)
EGFR: 120 ML/MIN/1.73M2 — SIGNIFICANT CHANGE UP
EGFR: 120 ML/MIN/1.73M2 — SIGNIFICANT CHANGE UP
GLUCOSE SERPL-MCNC: 101 MG/DL — HIGH (ref 70–99)
GRAM STN FLD: ABNORMAL
GRAM STN FLD: ABNORMAL
GRAM STN FLD: SIGNIFICANT CHANGE UP
GRAM STN FLD: SIGNIFICANT CHANGE UP
HCT VFR BLD CALC: 24.6 % — LOW (ref 34.5–45)
HGB BLD-MCNC: 7.9 G/DL — LOW (ref 11.5–15.5)
MAGNESIUM SERPL-MCNC: 2 MG/DL — SIGNIFICANT CHANGE UP (ref 1.6–2.6)
MCHC RBC-ENTMCNC: 26.5 PG — LOW (ref 27–34)
MCHC RBC-ENTMCNC: 32.1 G/DL — SIGNIFICANT CHANGE UP (ref 32–36)
MCV RBC AUTO: 82.6 FL — SIGNIFICANT CHANGE UP (ref 80–100)
METHOD TYPE: SIGNIFICANT CHANGE UP
METHOD TYPE: SIGNIFICANT CHANGE UP
NRBC BLD AUTO-RTO: 0 /100 WBCS — SIGNIFICANT CHANGE UP (ref 0–0)
PLATELET # BLD AUTO: 329 K/UL — SIGNIFICANT CHANGE UP (ref 150–400)
POTASSIUM SERPL-MCNC: 3.6 MMOL/L — SIGNIFICANT CHANGE UP (ref 3.5–5.3)
POTASSIUM SERPL-SCNC: 3.6 MMOL/L — SIGNIFICANT CHANGE UP (ref 3.5–5.3)
RBC # BLD: 2.98 M/UL — LOW (ref 3.8–5.2)
RBC # FLD: 14.7 % — HIGH (ref 10.3–14.5)
SODIUM SERPL-SCNC: 141 MMOL/L — SIGNIFICANT CHANGE UP (ref 135–145)
SPECIMEN SOURCE: SIGNIFICANT CHANGE UP
SPECIMEN SOURCE: SIGNIFICANT CHANGE UP
VANCOMYCIN TROUGH SERPL-MCNC: 16.9 UG/ML — SIGNIFICANT CHANGE UP (ref 10–20)
WBC # BLD: 7.69 K/UL — SIGNIFICANT CHANGE UP (ref 3.8–10.5)
WBC # FLD AUTO: 7.69 K/UL — SIGNIFICANT CHANGE UP (ref 3.8–10.5)

## 2025-07-07 PROCEDURE — 99232 SBSQ HOSP IP/OBS MODERATE 35: CPT

## 2025-07-07 RX ORDER — VANCOMYCIN HCL IN 5 % DEXTROSE 1.5G/250ML
1250 PLASTIC BAG, INJECTION (ML) INTRAVENOUS EVERY 12 HOURS
Refills: 0 | Status: DISCONTINUED | OUTPATIENT
Start: 2025-07-08 | End: 2025-07-08

## 2025-07-07 RX ORDER — TRAMADOL HYDROCHLORIDE 50 MG/1
50 TABLET, FILM COATED ORAL EVERY 6 HOURS
Refills: 0 | Status: DISCONTINUED | OUTPATIENT
Start: 2025-07-07 | End: 2025-07-12

## 2025-07-07 RX ADMIN — Medication 650 MILLIGRAM(S): at 15:07

## 2025-07-07 RX ADMIN — Medication 166.67 MILLIGRAM(S): at 08:28

## 2025-07-07 RX ADMIN — Medication 650 MILLIGRAM(S): at 08:24

## 2025-07-07 RX ADMIN — Medication 650 MILLIGRAM(S): at 14:07

## 2025-07-07 RX ADMIN — TRAMADOL HYDROCHLORIDE 50 MILLIGRAM(S): 50 TABLET, FILM COATED ORAL at 20:52

## 2025-07-07 RX ADMIN — TRAMADOL HYDROCHLORIDE 50 MILLIGRAM(S): 50 TABLET, FILM COATED ORAL at 21:40

## 2025-07-07 RX ADMIN — Medication 25 GRAM(S): at 10:00

## 2025-07-07 RX ADMIN — Medication 25 GRAM(S): at 01:22

## 2025-07-07 RX ADMIN — Medication 166.67 MILLIGRAM(S): at 14:03

## 2025-07-07 RX ADMIN — Medication 650 MILLIGRAM(S): at 09:24

## 2025-07-07 NOTE — PROGRESS NOTE ADULT - SUBJECTIVE AND OBJECTIVE BOX
Plastic Surgery Progress Note (pg [facility]: [id number], [facility]: [phone])    SUBJECTIVE  The patient was seen and examined. AVSS ON. SBP 90s. ARVIN 71    OBJECTIVE  ___________________________________________________  VITAL SIGNS / I&O's   Vital Signs Last 24 Hrs  T(C): 36.3 (07 Jul 2025 06:00), Max: 36.8 (06 Jul 2025 09:36)  T(F): 97.4 (07 Jul 2025 06:00), Max: 98.2 (06 Jul 2025 09:36)  HR: 55 (07 Jul 2025 06:00) (55 - 95)  BP: 94/59 (07 Jul 2025 06:00) (91/51 - 118/67)  BP(mean): --  RR: 19 (07 Jul 2025 06:00) (16 - 21)  SpO2: 99% (07 Jul 2025 06:00) (95% - 99%)    Parameters below as of 06 Jul 2025 17:38  Patient On (Oxygen Delivery Method): room air          06 Jul 2025 07:01  -  07 Jul 2025 07:00  --------------------------------------------------------  IN:    Lactated Ringers: 1000 mL  Total IN: 1000 mL    OUT:    Bulb (mL): 71 mL  Total OUT: 71 mL    Total NET: 929 mL        ___________________________________________________  PHYSICAL EXAM    NAD, AxOx3  Left breast swelling improved from previous exam. Improvement in erythema resolution of palpable fluid collection along lateral border. ARVIN in place x1 ss  skin paddle pink with good cap refill  right breast C/D/I - exam unchanged    ___________________________________________________  LABS                        8.4    9.06  )-----------( 313      ( 06 Jul 2025 05:40 )             26.0     06 Jul 2025 05:40    139    |  105    |  13     ----------------------------<  110    3.6     |  28     |  0.48     Ca    8.7        06 Jul 2025 05:40  Mg     1.9       06 Jul 2025 05:40    TPro  6.3    /  Alb  2.3    /  TBili  0.9    /  DBili  x      /  AST  26     /  ALT  49     /  AlkPhos  78     05 Jul 2025 11:30      CAPILLARY BLOOD GLUCOSE            Urinalysis Basic - ( 06 Jul 2025 05:40 )    Color: x / Appearance: x / SG: x / pH: x  Gluc: 110 mg/dL / Ketone: x  / Bili: x / Urobili: x   Blood: x / Protein: x / Nitrite: x   Leuk Esterase: x / RBC: x / WBC x   Sq Epi: x / Non Sq Epi: x / Bacteria: x      ___________________________________________________  MICRO  Recent Cultures:  Specimen Source: Surgical Swab Left Brest C&S #1 aerobic/anaerobic, 07-06 @ 13:40; Results --; Gram Stain:   Rare polymorphonuclear leukocytes seen per low power field  No organisms seen per oil power field; Organism: --  Specimen Source: Aspirate Aspirate, 07-05 @ 10:20; Results   Numerous Staphylococcus aureus[!]; Gram Stain:   Numerous polymorphonuclear leukocytes per low power field  Moderate Gram Positive Cocci in Clusters per oil power field[!]; Organism: --  Specimen Source: Clean Catch None, 07-04 @ 21:10; Results   No growth; Gram Stain: --; Organism: --  Specimen Source: Blood Blood-Peripheral, 07-04 @ 20:18; Results   No growth at 48 Hours; Gram Stain: --; Organism: --  Specimen Source: Blood Blood-Peripheral, 07-04 @ 20:15; Results   No growth at 48 Hours; Gram Stain: --; Organism: --    ___________________________________________________  MEDICATIONS  (STANDING):  lactated ringers. 1000 milliLiter(s) (75 mL/Hr) IV Continuous <Continuous>  piperacillin/tazobactam IVPB.. 3.375 Gram(s) IV Intermittent every 8 hours  vancomycin  IVPB 1250 milliGRAM(s) IV Intermittent every 8 hours    MEDICATIONS  (PRN):  acetaminophen     Tablet .. 650 milliGRAM(s) Oral every 6 hours PRN Mild Pain (1 - 3)  morphine  - Injectable 2 milliGRAM(s) IV Push every 6 hours PRN Moderate Pain (4 - 6)  morphine  - Injectable 4 milliGRAM(s) IV Push every 6 hours PRN Severe Pain (7 - 10)     Plastic Surgery Progress Note (pg [facility]: [id number], [facility]: [phone])    SUBJECTIVE  The patient was seen and examined. AVSS ON. SBP 90s. AVRIN 71    OBJECTIVE  ___________________________________________________  VITAL SIGNS / I&O's   Vital Signs Last 24 Hrs  T(C): 36.3 (07 Jul 2025 06:00), Max: 36.8 (06 Jul 2025 09:36)  T(F): 97.4 (07 Jul 2025 06:00), Max: 98.2 (06 Jul 2025 09:36)  HR: 55 (07 Jul 2025 06:00) (55 - 95)  BP: 94/59 (07 Jul 2025 06:00) (91/51 - 118/67)  BP(mean): --  RR: 19 (07 Jul 2025 06:00) (16 - 21)  SpO2: 99% (07 Jul 2025 06:00) (95% - 99%)    Parameters below as of 06 Jul 2025 17:38  Patient On (Oxygen Delivery Method): room air          06 Jul 2025 07:01  -  07 Jul 2025 07:00  --------------------------------------------------------  IN:    Lactated Ringers: 1000 mL  Total IN: 1000 mL    OUT:    Bulb (mL): 71 mL  Total OUT: 71 mL    Total NET: 929 mL        ___________________________________________________  PHYSICAL EXAM    NAD, AxOx3  Left breast swelling improved from previous exam. Significant erythema present but improved.  Reduction of palpable fluid collection along lateral border. ARVIN in place x1 ss  skin paddle pink with good cap refill  right breast C/D/I - exam unchanged    ___________________________________________________  LABS                        8.4    9.06  )-----------( 313      ( 06 Jul 2025 05:40 )             26.0     06 Jul 2025 05:40    139    |  105    |  13     ----------------------------<  110    3.6     |  28     |  0.48     Ca    8.7        06 Jul 2025 05:40  Mg     1.9       06 Jul 2025 05:40    TPro  6.3    /  Alb  2.3    /  TBili  0.9    /  DBili  x      /  AST  26     /  ALT  49     /  AlkPhos  78     05 Jul 2025 11:30      CAPILLARY BLOOD GLUCOSE            Urinalysis Basic - ( 06 Jul 2025 05:40 )    Color: x / Appearance: x / SG: x / pH: x  Gluc: 110 mg/dL / Ketone: x  / Bili: x / Urobili: x   Blood: x / Protein: x / Nitrite: x   Leuk Esterase: x / RBC: x / WBC x   Sq Epi: x / Non Sq Epi: x / Bacteria: x      ___________________________________________________  MICRO  Recent Cultures:  Specimen Source: Surgical Swab Left Brest C&S #1 aerobic/anaerobic, 07-06 @ 13:40; Results --; Gram Stain:   Rare polymorphonuclear leukocytes seen per low power field  No organisms seen per oil power field; Organism: --  Specimen Source: Aspirate Aspirate, 07-05 @ 10:20; Results   Numerous Staphylococcus aureus[!]; Gram Stain:   Numerous polymorphonuclear leukocytes per low power field  Moderate Gram Positive Cocci in Clusters per oil power field[!]; Organism: --  Specimen Source: Clean Catch None, 07-04 @ 21:10; Results   No growth; Gram Stain: --; Organism: --  Specimen Source: Blood Blood-Peripheral, 07-04 @ 20:18; Results   No growth at 48 Hours; Gram Stain: --; Organism: --  Specimen Source: Blood Blood-Peripheral, 07-04 @ 20:15; Results   No growth at 48 Hours; Gram Stain: --; Organism: --    ___________________________________________________  MEDICATIONS  (STANDING):  lactated ringers. 1000 milliLiter(s) (75 mL/Hr) IV Continuous <Continuous>  piperacillin/tazobactam IVPB.. 3.375 Gram(s) IV Intermittent every 8 hours  vancomycin  IVPB 1250 milliGRAM(s) IV Intermittent every 8 hours    MEDICATIONS  (PRN):  acetaminophen     Tablet .. 650 milliGRAM(s) Oral every 6 hours PRN Mild Pain (1 - 3)  morphine  - Injectable 2 milliGRAM(s) IV Push every 6 hours PRN Moderate Pain (4 - 6)  morphine  - Injectable 4 milliGRAM(s) IV Push every 6 hours PRN Severe Pain (7 - 10)

## 2025-07-07 NOTE — PROGRESS NOTE ADULT - ASSESSMENT
Assessment & Plan    Patient with left breast infection after HyPAD ADOLFO flap.       - POD1 from washout  - Plan pending discussion with PRS attending    Robby Smyth  Plastic & Reconstructive Surgery, PGY-3   Assessment & Plan    Patient with left breast infection after HyPAD ADOLFO flap.       - POD1 from washout, pt currently AVSS. With improving exam; however, continues to have significant erythema   - f/u AM labs  - f/u intraop culture   - Plan pending discussion with PRS attending    Robby Smyth  Plastic & Reconstructive Surgery, PGY-3

## 2025-07-07 NOTE — PROGRESS NOTE ADULT - SUBJECTIVE AND OBJECTIVE BOX
Patient is a 49y old  Female who presents with a chief complaint of L breast cellulitis    No events overnight  Washout done 7/6/25  Patient seen and examined at bedside.    ALLERGIES:  No Known Allergies    MEDICATIONS  (STANDING):  lactated ringers. 1000 milliLiter(s) (75 mL/Hr) IV Continuous <Continuous>  piperacillin/tazobactam IVPB.. 3.375 Gram(s) IV Intermittent every 8 hours  vancomycin  IVPB 1250 milliGRAM(s) IV Intermittent every 8 hours    MEDICATIONS  (PRN):  acetaminophen     Tablet .. 650 milliGRAM(s) Oral every 6 hours PRN Mild Pain (1 - 3)  morphine  - Injectable 2 milliGRAM(s) IV Push every 6 hours PRN Moderate Pain (4 - 6)  morphine  - Injectable 4 milliGRAM(s) IV Push every 6 hours PRN Severe Pain (7 - 10)    Vital Signs Last 24 Hrs  T(F): 97.4 (07 Jul 2025 06:00), Max: 98.2 (06 Jul 2025 09:36)  HR: 55 (07 Jul 2025 06:00) (55 - 95)  BP: 94/59 (07 Jul 2025 06:00) (91/51 - 118/67)  RR: 19 (07 Jul 2025 06:00) (16 - 21)  SpO2: 99% (07 Jul 2025 06:00) (95% - 99%)  I&O's Summary    06 Jul 2025 07:01  -  07 Jul 2025 07:00  --------------------------------------------------------  IN: 1000 mL / OUT: 71 mL / NET: 929 mL      BMI (kg/m2): 26.6 (07-06-25 @ 09:45)  PHYSICAL EXAM:  General: NAD, A/O x 3  ENT: MMM, no scleral icterus  Neck: Supple, No JVD, no thyroidomegaly  Lungs: Clear to auscultation bilaterally, no wheezes, no rales, no rhonchi, good inspiratory effort  Cardio: RRR, S1/S2, No murmurs  Abdomen: Soft, Nontender, Nondistended; Bowel sounds present  Extremities: No calf tenderness, No pitting edema, no skin changes    LABS:                        7.9    7.69  )-----------( 329      ( 07 Jul 2025 06:23 )             24.6       07-06    139  |  105  |  13  ----------------------------<  110  3.6   |  28  |  0.48    Ca    8.7      06 Jul 2025 05:40  Mg     1.9     07-06    TPro  6.3  /  Alb  2.3  /  TBili  0.9  /  DBili  x   /  AST  26  /  ALT  49  /  AlkPhos  78  07-05       PT/INR - ( 04 Jul 2025 20:15 )   PT: 12.3 sec;   INR: 1.04 ratio         PTT - ( 04 Jul 2025 20:15 )  PTT:29.0 sec   Lactate, Blood: 0.5 mmol/L (07-04 @ 20:15)    Urinalysis Basic - ( 06 Jul 2025 05:40 )    Color: x / Appearance: x / SG: x / pH: x  Gluc: 110 mg/dL / Ketone: x  / Bili: x / Urobili: x   Blood: x / Protein: x / Nitrite: x   Leuk Esterase: x / RBC: x / WBC x   Sq Epi: x / Non Sq Epi: x / Bacteria: x        Culture - Abscess with Gram Stain (collected 05 Jul 2025 10:20)  Source: Abscess left breast  Gram Stain (06 Jul 2025 00:45):    Moderate polymorphonuclear leukocytes seen per low power field    Few Gram Positive Cocci in Clusters seen per oil power field  Preliminary Report (06 Jul 2025 19:51):    Numerous Staphylococcus aureus    Culture - Urine (collected 04 Jul 2025 21:10)  Source: Clean Catch None  Final Report (06 Jul 2025 12:36):    No growth    Culture - Blood (collected 04 Jul 2025 20:18)  Source: Blood Blood-Peripheral  Preliminary Report (07 Jul 2025 02:02):    No growth at 48 Hours    Culture - Blood (collected 04 Jul 2025 20:15)  Source: Blood Blood-Peripheral  Preliminary Report (07 Jul 2025 02:02):    No growth at 48 Hours

## 2025-07-07 NOTE — PROGRESS NOTE ADULT - ASSESSMENT
50 y/o female w/ PMH of nephrolithiasis, left breast cancer s/p B/L mastectomy and ADOLFO on 6/17/2025 with left breast cellulitis and collection likely hematoma/seroma per CT and acute anemia.    # Sepsis (present on admission; , WBC 13.4) due to left breast abscess and cellulitis  #POD #1 from washout  - Pt. is > 2.5 weeks s/p bilateral mastectomy and ADOLFO flap  - CT C/A/P: lateral left breast - 3.3 x 2.5 x 4.9 cm collection with surrounding fat stranding, likely postop hematoma/seroma, superimposed infection is not definitively excluded. At the medial aspect of the left breast there is a small amount of subcutaneous fluid without definite organization.  - 7/6 s/p of purulent fluid was aspirated from the lateral chest pocket by surgical PA > cultures sent > growing gram positive cocci > f/u sensitivities  - 7/4 blood cultures no growth at 24 hours  - c/w zosyn and vanco  - c/w tylenol as needed for pain  - ID consult today    # Left breast seroma/hematoma  # Anemia  - Hgb 7.9, s/p 1U PRBC   - Keep active T+S  - Trend CBC    #Hypokalemia  - Will monitor    # VTE ppx: SCDs for now  AM labs  # GOC: Full code

## 2025-07-08 LAB
-  CLINDAMYCIN: SIGNIFICANT CHANGE UP
-  CLINDAMYCIN: SIGNIFICANT CHANGE UP
-  ERYTHROMYCIN: SIGNIFICANT CHANGE UP
-  ERYTHROMYCIN: SIGNIFICANT CHANGE UP
-  GENTAMICIN: SIGNIFICANT CHANGE UP
-  GENTAMICIN: SIGNIFICANT CHANGE UP
-  OXACILLIN: SIGNIFICANT CHANGE UP
-  OXACILLIN: SIGNIFICANT CHANGE UP
-  PENICILLIN: SIGNIFICANT CHANGE UP
-  PENICILLIN: SIGNIFICANT CHANGE UP
-  RIFAMPIN: SIGNIFICANT CHANGE UP
-  RIFAMPIN: SIGNIFICANT CHANGE UP
-  TETRACYCLINE: SIGNIFICANT CHANGE UP
-  TETRACYCLINE: SIGNIFICANT CHANGE UP
-  TRIMETHOPRIM/SULFAMETHOXAZOLE: SIGNIFICANT CHANGE UP
-  TRIMETHOPRIM/SULFAMETHOXAZOLE: SIGNIFICANT CHANGE UP
-  VANCOMYCIN: SIGNIFICANT CHANGE UP
-  VANCOMYCIN: SIGNIFICANT CHANGE UP
ANION GAP SERPL CALC-SCNC: 6 MMOL/L — SIGNIFICANT CHANGE UP (ref 5–17)
ANION GAP SERPL CALC-SCNC: 9 MMOL/L — SIGNIFICANT CHANGE UP (ref 5–17)
BUN SERPL-MCNC: 29 MG/DL — HIGH (ref 7–23)
BUN SERPL-MCNC: 31 MG/DL — HIGH (ref 7–23)
CALCIUM SERPL-MCNC: 9.2 MG/DL — SIGNIFICANT CHANGE UP (ref 8.4–10.5)
CALCIUM SERPL-MCNC: 9.3 MG/DL — SIGNIFICANT CHANGE UP (ref 8.4–10.5)
CHLORIDE SERPL-SCNC: 103 MMOL/L — SIGNIFICANT CHANGE UP (ref 96–108)
CHLORIDE SERPL-SCNC: 104 MMOL/L — SIGNIFICANT CHANGE UP (ref 96–108)
CO2 SERPL-SCNC: 29 MMOL/L — SIGNIFICANT CHANGE UP (ref 22–31)
CO2 SERPL-SCNC: 29 MMOL/L — SIGNIFICANT CHANGE UP (ref 22–31)
CREAT SERPL-MCNC: 1.73 MG/DL — HIGH (ref 0.5–1.3)
CREAT SERPL-MCNC: 1.85 MG/DL — HIGH (ref 0.5–1.3)
EGFR: 33 ML/MIN/1.73M2 — LOW
EGFR: 33 ML/MIN/1.73M2 — LOW
EGFR: 36 ML/MIN/1.73M2 — LOW
EGFR: 36 ML/MIN/1.73M2 — LOW
GLUCOSE SERPL-MCNC: 105 MG/DL — HIGH (ref 70–99)
GLUCOSE SERPL-MCNC: 96 MG/DL — SIGNIFICANT CHANGE UP (ref 70–99)
HCT VFR BLD CALC: 26.7 % — LOW (ref 34.5–45)
HGB BLD-MCNC: 8.4 G/DL — LOW (ref 11.5–15.5)
MAGNESIUM SERPL-MCNC: 2.1 MG/DL — SIGNIFICANT CHANGE UP (ref 1.6–2.6)
MCHC RBC-ENTMCNC: 25.9 PG — LOW (ref 27–34)
MCHC RBC-ENTMCNC: 31.5 G/DL — LOW (ref 32–36)
MCV RBC AUTO: 82.4 FL — SIGNIFICANT CHANGE UP (ref 80–100)
METHOD TYPE: SIGNIFICANT CHANGE UP
METHOD TYPE: SIGNIFICANT CHANGE UP
NRBC BLD AUTO-RTO: 0 /100 WBCS — SIGNIFICANT CHANGE UP (ref 0–0)
PLATELET # BLD AUTO: 352 K/UL — SIGNIFICANT CHANGE UP (ref 150–400)
POTASSIUM SERPL-MCNC: 3.8 MMOL/L — SIGNIFICANT CHANGE UP (ref 3.5–5.3)
POTASSIUM SERPL-MCNC: 4.2 MMOL/L — SIGNIFICANT CHANGE UP (ref 3.5–5.3)
POTASSIUM SERPL-SCNC: 3.8 MMOL/L — SIGNIFICANT CHANGE UP (ref 3.5–5.3)
POTASSIUM SERPL-SCNC: 4.2 MMOL/L — SIGNIFICANT CHANGE UP (ref 3.5–5.3)
RBC # BLD: 3.24 M/UL — LOW (ref 3.8–5.2)
RBC # FLD: 14.9 % — HIGH (ref 10.3–14.5)
SODIUM SERPL-SCNC: 138 MMOL/L — SIGNIFICANT CHANGE UP (ref 135–145)
SODIUM SERPL-SCNC: 142 MMOL/L — SIGNIFICANT CHANGE UP (ref 135–145)
WBC # BLD: 8.55 K/UL — SIGNIFICANT CHANGE UP (ref 3.8–10.5)
WBC # FLD AUTO: 8.55 K/UL — SIGNIFICANT CHANGE UP (ref 3.8–10.5)

## 2025-07-08 PROCEDURE — 99232 SBSQ HOSP IP/OBS MODERATE 35: CPT

## 2025-07-08 RX ORDER — CEFAZOLIN SODIUM IN 0.9 % NACL 3 G/100 ML
2000 INTRAVENOUS SOLUTION, PIGGYBACK (ML) INTRAVENOUS EVERY 8 HOURS
Refills: 0 | Status: DISCONTINUED | OUTPATIENT
Start: 2025-07-08 | End: 2025-07-08

## 2025-07-08 RX ORDER — LACTOBACILLUS ACIDOPHILUS/PECT 75 MM-100
1 CAPSULE ORAL DAILY
Refills: 0 | Status: DISCONTINUED | OUTPATIENT
Start: 2025-07-08 | End: 2025-07-12

## 2025-07-08 RX ORDER — ACETAMINOPHEN 500 MG/5ML
2 LIQUID (ML) ORAL
Qty: 0 | Refills: 0 | DISCHARGE
Start: 2025-07-08

## 2025-07-08 RX ORDER — TRAMADOL HYDROCHLORIDE 50 MG/1
1 TABLET, FILM COATED ORAL
Qty: 0 | Refills: 0 | DISCHARGE
Start: 2025-07-08

## 2025-07-08 RX ORDER — CEPHALEXIN 250 MG/1
500 CAPSULE ORAL
Refills: 0 | Status: DISCONTINUED | OUTPATIENT
Start: 2025-07-08 | End: 2025-07-10

## 2025-07-08 RX ORDER — ENOXAPARIN SODIUM 100 MG/ML
40 INJECTION SUBCUTANEOUS
Refills: 0 | DISCHARGE

## 2025-07-08 RX ORDER — ONDANSETRON HCL/PF 4 MG/2 ML
4 VIAL (ML) INJECTION ONCE
Refills: 0 | Status: COMPLETED | OUTPATIENT
Start: 2025-07-08 | End: 2025-07-08

## 2025-07-08 RX ADMIN — CEPHALEXIN 500 MILLIGRAM(S): 250 CAPSULE ORAL at 23:12

## 2025-07-08 RX ADMIN — CEPHALEXIN 500 MILLIGRAM(S): 250 CAPSULE ORAL at 17:05

## 2025-07-08 RX ADMIN — Medication 650 MILLIGRAM(S): at 01:03

## 2025-07-08 RX ADMIN — Medication 1 TABLET(S): at 17:06

## 2025-07-08 RX ADMIN — TRAMADOL HYDROCHLORIDE 50 MILLIGRAM(S): 50 TABLET, FILM COATED ORAL at 04:20

## 2025-07-08 RX ADMIN — Medication 4 MILLIGRAM(S): at 09:55

## 2025-07-08 RX ADMIN — TRAMADOL HYDROCHLORIDE 50 MILLIGRAM(S): 50 TABLET, FILM COATED ORAL at 21:49

## 2025-07-08 RX ADMIN — TRAMADOL HYDROCHLORIDE 50 MILLIGRAM(S): 50 TABLET, FILM COATED ORAL at 22:49

## 2025-07-08 RX ADMIN — TRAMADOL HYDROCHLORIDE 50 MILLIGRAM(S): 50 TABLET, FILM COATED ORAL at 03:29

## 2025-07-08 RX ADMIN — Medication 650 MILLIGRAM(S): at 00:17

## 2025-07-08 RX ADMIN — Medication 83 MILLILITER(S): at 11:12

## 2025-07-08 RX ADMIN — Medication 166.67 MILLIGRAM(S): at 06:10

## 2025-07-08 NOTE — PROGRESS NOTE ADULT - ASSESSMENT
50 y/o female w/ PMH of nephrolithiasis, left breast cancer s/p B/L mastectomy and ADOLFO on 6/17/2025 with left breast cellulitis and collection likely hematoma/seroma per CT and acute anemia.    # Sepsis (present on admission; , WBC 13.4) due to left breast abscess and cellulitis  #POD #2 from washout  - Pt. is > 2.5 weeks s/p bilateral mastectomy and ADOLFO flap  - CT C/A/P: lateral left breast - 3.3 x 2.5 x 4.9 cm collection with surrounding fat stranding, likely postop hematoma/seroma, superimposed infection is not definitively excluded. At the medial aspect of the left breast there is a small amount of subcutaneous fluid without definite organization.  - 7/6 s/p of purulent fluid was aspirated from the lateral chest pocket by surgical PA > cultures sent > growing gram positive cocci > f/u sensitivities  - 7/4 blood cultures no growth at 24 hours  - c/w vanco  - c/w tylenol as needed for pain  - ID consult appreciated    # Left breast seroma/hematoma  # Anemia  - Hgb 8.4, s/p 1U PRBC   - Keep active T+S  - Trend CBC    #Hypokalemia  - Will monitor    # VTE ppx: SCDs for now  AM labs  # GOC: Full code         50 y/o female w/ PMH of nephrolithiasis, left breast cancer s/p B/L mastectomy and ADOLFO on 6/17/2025 with left breast cellulitis and collection likely hematoma/seroma per CT and acute anemia.    # Sepsis (present on admission; , WBC 13.4) due to left breast abscess and cellulitis  #POD #2 from washout  - Pt. is > 2.5 weeks s/p bilateral mastectomy and ADOLFO flap  - Appreciate surgery and ID consult teams  - CT C/A/P: lateral left breast - 3.3 x 2.5 x 4.9 cm collection with surrounding fat stranding, likely postop hematoma/seroma, superimposed infection is not definitively excluded. At the medial aspect of the left breast there is a small amount of subcutaneous fluid without definite organization.  - 7/6 s/p of purulent fluid was aspirated from the lateral chest pocket by surgical PA > cultures sent > growing gram positive cocci > f/u sensitivities  - 7/4 blood cultures no growth at 24 hours  - WIll stop vancomycin today given RICARDO.....abx as per ID team  - c/w tylenol as needed for pain    #RICARDO  - Noted creatinine 1.7  - Consulted Dr Devine  - WIll stop vancomycin, start IVF  - BMP in AM  - Avoid nephrotoxic agents    # Left breast seroma/hematoma  # Anemia  - Hgb 8.4, s/p 1U PRBC   - Keep active T+S  - Trend CBC    #Hypokalemia  - Will monitor    # VTE ppx: SCDs for now  AM labs  # GOC: Full code

## 2025-07-08 NOTE — DISCHARGE NOTE PROVIDER - NSDCMRMEDTOKEN_GEN_ALL_CORE_FT
acetaminophen 325 mg oral tablet: 2 tab(s) orally every 6 hours As needed Mild Pain (1 - 3)  traMADol 50 mg oral tablet: 1 tab(s) orally every 6 hours As needed Moderate Pain (4 - 6)   acetaminophen 325 mg oral tablet: 2 tab(s) orally every 6 hours As needed Mild Pain (1 - 3)  doxycycline monohydrate 50 mg oral capsule: 2 cap(s) orally every 12 hours  lactobacillus acidophilus oral capsule: 1 tab(s) orally once a day  ondansetron 4 mg oral tablet, disintegratin tab(s) orally every 6 hours as needed for  nausea

## 2025-07-08 NOTE — DISCHARGE NOTE PROVIDER - NSDCCPCAREPLAN_GEN_ALL_CORE_FT
PRINCIPAL DISCHARGE DIAGNOSIS  Diagnosis: Cellulitis  Assessment and Plan of Treatment:      PRINCIPAL DISCHARGE DIAGNOSIS  Diagnosis: Cellulitis  Assessment and Plan of Treatment: You were seen by infectious disease and your breast surgeon. Please follow up with breast surgeon. Please continue oral doxycyline as directed.     PRINCIPAL DISCHARGE DIAGNOSIS  Diagnosis: Cellulitis  Assessment and Plan of Treatment: You were seen by infectious disease and your breast surgeon. Please follow up with breast surgeon. Please continue oral doxycyline as directed. Follow up with Dr German

## 2025-07-08 NOTE — PROGRESS NOTE ADULT - SUBJECTIVE AND OBJECTIVE BOX
Patient is a 49y old  Female who presents with a chief complaint of L breast cellulitis (07 Jul 2025 16:59)      Patient seen and examined at bedside.    ALLERGIES:  No Known Allergies    MEDICATIONS  (STANDING):  vancomycin  IVPB 1250 milliGRAM(s) IV Intermittent every 12 hours    MEDICATIONS  (PRN):  acetaminophen     Tablet .. 650 milliGRAM(s) Oral every 6 hours PRN Mild Pain (1 - 3)  traMADol 50 milliGRAM(s) Oral every 6 hours PRN Moderate Pain (4 - 6)    Vital Signs Last 24 Hrs  T(F): 97.7 (08 Jul 2025 06:51), Max: 99 (07 Jul 2025 22:26)  HR: 76 (08 Jul 2025 06:51) (74 - 85)  BP: 94/60 (08 Jul 2025 06:51) (93/58 - 99/62)  RR: 17 (08 Jul 2025 06:51) (15 - 18)  SpO2: 95% (08 Jul 2025 06:51) (95% - 99%)  I&O's Summary    07 Jul 2025 07:01  -  08 Jul 2025 07:00  --------------------------------------------------------  IN: 600 mL / OUT: 37.5 mL / NET: 562.5 mL      BMI (kg/m2): 26.6 (07-06-25 @ 09:45)  PHYSICAL EXAM:  General: NAD, A/O x 3  ENT: MMM, no scleral icterus  Neck: Supple, No JVD, no thyroidomegaly  Lungs: Clear to auscultation bilaterally, no wheezes, no rales, no rhonchi, good inspiratory effort  Cardio: RRR, S1/S2, No murmurs  Abdomen: Soft, Nontender, Nondistended; Bowel sounds present  Extremities: No calf tenderness, No pitting edema, no skin changes    LABS:                        8.4    8.55  )-----------( 352      ( 08 Jul 2025 06:05 )             26.7       07-07    141  |  104  |  7   ----------------------------<  101  3.6   |  28  |  0.43    Ca    9.5      07 Jul 2025 06:23  Mg     2.0     07-07    TPro  6.3  /  Alb  2.3  /  TBili  0.9  /  DBili  x   /  AST  26  /  ALT  49  /  AlkPhos  78  07-05                                  Urinalysis Basic - ( 07 Jul 2025 06:23 )    Color: x / Appearance: x / SG: x / pH: x  Gluc: 101 mg/dL / Ketone: x  / Bili: x / Urobili: x   Blood: x / Protein: x / Nitrite: x   Leuk Esterase: x / RBC: x / WBC x   Sq Epi: x / Non Sq Epi: x / Bacteria: x        Culture - Abscess with Gram Stain (collected 05 Jul 2025 10:20)  Source: Abscess left breast  Gram Stain (06 Jul 2025 00:45):    Moderate polymorphonuclear leukocytes seen per low power field    Few Gram Positive Cocci in Clusters seen per oil power field  Preliminary Report (06 Jul 2025 19:51):    Numerous Staphylococcus aureus  Organism: Staphylococcus aureus (07 Jul 2025 19:01)  Organism: Staphylococcus aureus (07 Jul 2025 19:01)      -  Clindamycin: S <=0.25      -  Oxacillin: S 0.5 Oxacillin predicts susceptibility for dicloxacillin, methicillin, and nafcillin      -  Gentamicin: S <=4 Should not be used as monotherapy      -  Vancomycin: S 1      -  Tetracycline: S <=4      Method Type: MICKY      -  Penicillin: R >2      -  Rifampin: S <=1 Should not be used as monotherapy      -  Erythromycin: R >4      -  Trimethoprim/Sulfamethoxazole: S <=0.5/9.5    Culture - Urine (collected 04 Jul 2025 21:10)  Source: Clean Catch None  Final Report (06 Jul 2025 12:36):    No growth    Culture - Blood (collected 04 Jul 2025 20:18)  Source: Blood Blood-Peripheral  Preliminary Report (08 Jul 2025 02:02):    No growth at 72 Hours    Culture - Blood (collected 04 Jul 2025 20:15)  Source: Blood Blood-Peripheral  Preliminary Report (08 Jul 2025 02:02):    No growth at 72 Hours              RADIOLOGY & ADDITIONAL TESTS:  Care Discussed with Consultants/Other Providers:

## 2025-07-08 NOTE — PROGRESS NOTE ADULT - SUBJECTIVE AND OBJECTIVE BOX
patient seen and examined. she is feeling much better today. states that swelling is significantly improved. Patient did   require dose of pain medication overnight. Cultures growing staph aureus.    Vital Signs Last 24 Hrs  T(C): 36.5 (08 Jul 2025 06:51), Max: 37.2 (07 Jul 2025 22:26)  T(F): 97.7 (08 Jul 2025 06:51), Max: 99 (07 Jul 2025 22:26)  HR: 76 (08 Jul 2025 06:51) (74 - 85)  BP: 94/60 (08 Jul 2025 06:51) (93/58 - 99/62)  BP(mean): --  RR: 17 (08 Jul 2025 06:51) (15 - 18)  SpO2: 95% (08 Jul 2025 06:51) (95% - 99%)    Parameters below as of 08 Jul 2025 06:51  Patient On (Oxygen Delivery Method): room air                          8.4    8.55  )-----------( 352      ( 08 Jul 2025 06:05 )             26.7     Exam:  Right breast exam unchanged  left breast with significant improvement in swelling and erythema. still moderate warmth laterally.   drain serosang.   both ADOLFO flaps are viable

## 2025-07-08 NOTE — DISCHARGE NOTE PROVIDER - HOSPITAL COURSE
Hospital Course  HPI:  49 F hx of nephrolithiasis, L breast cancer s/p bl mastectomy and ADOLFO on 6/17/2025. s/p 2 drains removed from R breast on Monday and 2 drains removed from L breast on Wednesday. Subsequently the L breast started to become painful and swollen after the drains were removed. She had returned on yesterday and re-evaluated and dopplers were reportedly fine and started on Keflex for L breast infection. She continued to have persistent pain and swelling and advised ED visit. Dened any fevers or chills. +sweats.   No cough, SOB, NVD, dysuria, melena.   did take toradol for pain control.  on lovenox 40mg qhs for 30 days post sx.     s/p CT chest :   At the lateral aspect of the left breast there is a 3.3 x 2.5 x 4.9 cm   collection with surrounding fat stranding, likely postoperative   hematoma/seroma, superimposed infection is not definitively excluded.    At the medial aspect of the left breast there is a small amount of   subcutaneous fluid without definite organization.   (04 Jul 2025 23:07)      Admitted to medicine, plastic Dr Shikowitz-Behr consulted, ID consulted  Initially started on vancomycin and zosyn  Washout done on 7/6; cultures from OR sent    Noted to have anemia, given one unit PRBC with good response    Currently stable for discharge home  f/U PCP, breast surgeon Hospital Course: admitted 7/4     50 y/o female w/ PMH of nephrolithiasis, left breast cancer s/p B/L mastectomy and ADOLFO on 6/17/2025 with left breast cellulitis and collection likely hematoma/seroma per CT and acute anemia. Patient was septic on admission 2/2 left breast abscess and cellulitis. CT C/A/P: lateral left breast - 3.3 x 2.5 x 4.9 cm collection with surrounding fat stranding, likely postop hematoma/seroma, superimposed infection is not definitively excluded. At the medial aspect of the left breast there is a small amount of subcutaneous fluid without definite organization. 7/6 s/p of purulent fluid was aspirated from the lateral chest pocket by surgical PA > cultures sent > growing gram staph aureus > sensitivities reviewed. Patient was seen by ID who treated with directed abx, plan to continue home on oral doxy. Patient seen by surgery, plastics. No plan for further intervention. Patient had 1 unit PRBC likely secondary to hematoma/seroma of left breast. Hgb now stable. Of note, patient had RICARDO while admitted, RICARDO possibly septic/hemodynamic and/or Vanco related. Cr downtrended, stable for d/c per nephro. Plan to follow up with outpatient PCP. Plan to follow up with Dr Rivera-Behr       You will need to follow up with your primary care physician.    Source of Infection: Cellulitis of breast  Antibiotic / Last Day: Doxycycline oral: 7/15 last day     Discharging Provider:  Sukumar Cazares NP  Contact Info: Cell [phone] - Please call with any questions or concerns.    Outpatient Provider: Dr Cheng - aware      Hospital Course: admitted 7/4     50 y/o female w/ PMH of nephrolithiasis, left breast cancer s/p B/L mastectomy and ADOLFO on 6/17/2025 with left breast cellulitis and collection likely hematoma/seroma per CT and acute anemia. Patient was septic on admission 2/2 left breast abscess and cellulitis. CT C/A/P: lateral left breast - 3.3 x 2.5 x 4.9 cm collection with surrounding fat stranding, likely postop hematoma/seroma, superimposed infection is not definitively excluded. At the medial aspect of the left breast there is a small amount of subcutaneous fluid without definite organization. 7/6 s/p of purulent fluid was aspirated from the lateral chest pocket by surgical PA > cultures sent > growing gram staph aureus > sensitivities reviewed. Patient was seen by ID who treated with directed abx, plan to continue home on oral doxy. Patient seen by surgery, plastics. No plan for further intervention. Patient had 1 unit PRBC likely secondary to hematoma/seroma of left breast. Hgb now stable. Of note, patient had RICARDO while admitted, RICARDO possibly septic/hemodynamic and/or Vanco related. Cr downtrended, stable for d/c per nephro. Plan to follow up with outpatient PCP. Plan to follow up with Dr German     Drain instructions: Please maintain area around drain clean, dry. Please keep hands clean if managing drain. Open drain and record amount drained. Compress the bulb and close the plug to maintain suction. Please empty drain when it is half full or more, and or twice daily. Keep site clean and dry, check daily for redness, swelling, pus, or increased pain. Secure the drain to clothing to avoid pulling.     You will need to follow up with your primary care physician.    Source of Infection: Cellulitis of breast  Antibiotic / Last Day: Doxycycline oral: 7/15 last day     Discharging Provider:  Sukumar Cazares NP  Contact Info: Cell [phone] - Please call with any questions or concerns.    Outpatient Provider: Dr Cheng - aware

## 2025-07-08 NOTE — DISCHARGE NOTE PROVIDER - PROVIDER TOKENS
PROVIDER:[TOKEN:[[id number]]] PROVIDER:[TOKEN:[[id number]]],PROVIDER:[TOKEN:[[id number]]] PROVIDER:[TOKEN:[[id number]]],PROVIDER:[TOKEN:[[id number]]]

## 2025-07-08 NOTE — PROGRESS NOTE ADULT - ASSESSMENT
Patient is s/p left breast washout, improving daily.   Await culture sensitivities  dispo planning with PO abx and drain  follow up as outpatient later this week

## 2025-07-08 NOTE — DISCHARGE NOTE PROVIDER - CARE PROVIDERS DIRECT ADDRESSES
[email or web address]

## 2025-07-08 NOTE — DISCHARGE NOTE PROVIDER - CARE PROVIDER_API CALL
Shikowitz-Behr, Lauren [facility]  Plastic Surgery  [address] [place] [address], [address]  [place] [address]  Phone: [phone]  Fax: [phone]  Follow Up Time:    Shikowitz-Behr, Lauren [facility]  Plastic Surgery  [address], [address]  [place] [address]  Phone: [phone]  Fax: [phone]  Follow Up Time:     Silvana Devine  Nephrology  [address], [address]  [place] [address]  Phone: [phone]  Fax: [phone]  Follow Up Time:    Silvana Devine  Nephrology  [address], [address]  [place] [address]  Phone: [phone]  Fax: [phone]  Follow Up Time:     Domo German  Plastic Surgery  [address] [place] [address], [address]  [place] [address]  Phone: [phone]  Fax: [phone]  Follow Up Time:

## 2025-07-09 LAB
ANION GAP SERPL CALC-SCNC: 8 MMOL/L — SIGNIFICANT CHANGE UP (ref 5–17)
BUN SERPL-MCNC: 21 MG/DL — SIGNIFICANT CHANGE UP (ref 7–23)
CALCIUM SERPL-MCNC: 8.8 MG/DL — SIGNIFICANT CHANGE UP (ref 8.4–10.5)
CHLORIDE SERPL-SCNC: 108 MMOL/L — SIGNIFICANT CHANGE UP (ref 96–108)
CO2 SERPL-SCNC: 26 MMOL/L — SIGNIFICANT CHANGE UP (ref 22–31)
CREAT SERPL-MCNC: 1.8 MG/DL — HIGH (ref 0.5–1.3)
EGFR: 34 ML/MIN/1.73M2 — LOW
EGFR: 34 ML/MIN/1.73M2 — LOW
GLUCOSE SERPL-MCNC: 106 MG/DL — HIGH (ref 70–99)
HCT VFR BLD CALC: 25.3 % — LOW (ref 34.5–45)
HGB BLD-MCNC: 8 G/DL — LOW (ref 11.5–15.5)
MAGNESIUM SERPL-MCNC: 2.1 MG/DL — SIGNIFICANT CHANGE UP (ref 1.6–2.6)
MCHC RBC-ENTMCNC: 26 PG — LOW (ref 27–34)
MCHC RBC-ENTMCNC: 31.6 G/DL — LOW (ref 32–36)
MCV RBC AUTO: 82.1 FL — SIGNIFICANT CHANGE UP (ref 80–100)
NRBC BLD AUTO-RTO: 0 /100 WBCS — SIGNIFICANT CHANGE UP (ref 0–0)
PLATELET # BLD AUTO: 290 K/UL — SIGNIFICANT CHANGE UP (ref 150–400)
POTASSIUM SERPL-MCNC: 3.9 MMOL/L — SIGNIFICANT CHANGE UP (ref 3.5–5.3)
POTASSIUM SERPL-SCNC: 3.9 MMOL/L — SIGNIFICANT CHANGE UP (ref 3.5–5.3)
RBC # BLD: 3.08 M/UL — LOW (ref 3.8–5.2)
RBC # FLD: 15.1 % — HIGH (ref 10.3–14.5)
SODIUM SERPL-SCNC: 142 MMOL/L — SIGNIFICANT CHANGE UP (ref 135–145)
WBC # BLD: 9.83 K/UL — SIGNIFICANT CHANGE UP (ref 3.8–10.5)
WBC # FLD AUTO: 9.83 K/UL — SIGNIFICANT CHANGE UP (ref 3.8–10.5)

## 2025-07-09 PROCEDURE — 83605 ASSAY OF LACTIC ACID: CPT

## 2025-07-09 PROCEDURE — 87077 CULTURE AEROBIC IDENTIFY: CPT

## 2025-07-09 PROCEDURE — 87186 SC STD MICRODIL/AGAR DIL: CPT

## 2025-07-09 PROCEDURE — 93005 ELECTROCARDIOGRAM TRACING: CPT

## 2025-07-09 PROCEDURE — 85027 COMPLETE CBC AUTOMATED: CPT

## 2025-07-09 PROCEDURE — 74177 CT ABD & PELVIS W/CONTRAST: CPT

## 2025-07-09 PROCEDURE — 99233 SBSQ HOSP IP/OBS HIGH 50: CPT

## 2025-07-09 PROCEDURE — 84702 CHORIONIC GONADOTROPIN TEST: CPT

## 2025-07-09 PROCEDURE — 85025 COMPLETE CBC W/AUTO DIFF WBC: CPT

## 2025-07-09 PROCEDURE — 36430 TRANSFUSION BLD/BLD COMPNT: CPT

## 2025-07-09 PROCEDURE — 87086 URINE CULTURE/COLONY COUNT: CPT

## 2025-07-09 PROCEDURE — 81001 URINALYSIS AUTO W/SCOPE: CPT

## 2025-07-09 PROCEDURE — 85610 PROTHROMBIN TIME: CPT

## 2025-07-09 PROCEDURE — 80202 ASSAY OF VANCOMYCIN: CPT

## 2025-07-09 PROCEDURE — 71260 CT THORAX DX C+: CPT

## 2025-07-09 PROCEDURE — 80053 COMPREHEN METABOLIC PANEL: CPT

## 2025-07-09 PROCEDURE — 87040 BLOOD CULTURE FOR BACTERIA: CPT

## 2025-07-09 PROCEDURE — 86901 BLOOD TYPING SEROLOGIC RH(D): CPT

## 2025-07-09 PROCEDURE — 83735 ASSAY OF MAGNESIUM: CPT

## 2025-07-09 PROCEDURE — 80048 BASIC METABOLIC PNL TOTAL CA: CPT

## 2025-07-09 PROCEDURE — 87205 SMEAR GRAM STAIN: CPT

## 2025-07-09 PROCEDURE — 36415 COLL VENOUS BLD VENIPUNCTURE: CPT

## 2025-07-09 PROCEDURE — 86900 BLOOD TYPING SEROLOGIC ABO: CPT

## 2025-07-09 PROCEDURE — 85730 THROMBOPLASTIN TIME PARTIAL: CPT

## 2025-07-09 PROCEDURE — 86850 RBC ANTIBODY SCREEN: CPT

## 2025-07-09 PROCEDURE — 86923 COMPATIBILITY TEST ELECTRIC: CPT

## 2025-07-09 PROCEDURE — P9016: CPT

## 2025-07-09 PROCEDURE — 87070 CULTURE OTHR SPECIMN AEROBIC: CPT

## 2025-07-09 PROCEDURE — 87075 CULTR BACTERIA EXCEPT BLOOD: CPT

## 2025-07-09 RX ORDER — ONDANSETRON HCL/PF 4 MG/2 ML
4 VIAL (ML) INJECTION THREE TIMES A DAY
Refills: 0 | Status: DISCONTINUED | OUTPATIENT
Start: 2025-07-09 | End: 2025-07-12

## 2025-07-09 RX ADMIN — TRAMADOL HYDROCHLORIDE 50 MILLIGRAM(S): 50 TABLET, FILM COATED ORAL at 04:53

## 2025-07-09 RX ADMIN — CEPHALEXIN 500 MILLIGRAM(S): 250 CAPSULE ORAL at 05:23

## 2025-07-09 RX ADMIN — Medication 4 MILLIGRAM(S): at 14:49

## 2025-07-09 RX ADMIN — TRAMADOL HYDROCHLORIDE 50 MILLIGRAM(S): 50 TABLET, FILM COATED ORAL at 21:47

## 2025-07-09 RX ADMIN — TRAMADOL HYDROCHLORIDE 50 MILLIGRAM(S): 50 TABLET, FILM COATED ORAL at 04:23

## 2025-07-09 RX ADMIN — Medication 650 MILLIGRAM(S): at 00:00

## 2025-07-09 RX ADMIN — Medication 650 MILLIGRAM(S): at 14:23

## 2025-07-09 RX ADMIN — Medication 83 MILLILITER(S): at 07:20

## 2025-07-09 RX ADMIN — CEPHALEXIN 500 MILLIGRAM(S): 250 CAPSULE ORAL at 17:30

## 2025-07-09 RX ADMIN — CEPHALEXIN 500 MILLIGRAM(S): 250 CAPSULE ORAL at 12:01

## 2025-07-09 RX ADMIN — TRAMADOL HYDROCHLORIDE 50 MILLIGRAM(S): 50 TABLET, FILM COATED ORAL at 22:47

## 2025-07-09 RX ADMIN — CEPHALEXIN 500 MILLIGRAM(S): 250 CAPSULE ORAL at 23:28

## 2025-07-09 RX ADMIN — Medication 1 TABLET(S): at 12:01

## 2025-07-09 NOTE — DIETITIAN INITIAL EVALUATION ADULT - OTHER INFO
49 F hx of nephrolithiasis, L breast cancer s/p bl mastectomy and ADOLFO on 6/17/2025., admitted with cellulitis , s/p Washout of left breast washout (7/6) , NS @ 83ml/hr infusing patient with RICARDO , Nephro note reviewed , Last BM (7/6) No edema , patient offers no complaints consumes healthy diet PTA .

## 2025-07-09 NOTE — PROGRESS NOTE ADULT - ASSESSMENT
Patient with left breast infection after HyPAD ADOLFO flap.       - POD3 from washout, pt currently AVSS. Exam much improved. Erythema largely resolved   - f/u AM labs, trending Cr  - Cx w/ resulted sensitivities. Will f/u ID recs for outpt abx  - Plan pending discussion with PRS attending    Robby Smyth  Plastic & Reconstructive Surgery, PGY-3 Patient with left breast infection after HyPAD ADOLFO flap.       - POD3 from washout, pt currently AVSS. Exam much improved. Erythema largely resolved   - f/u AM labs, trending Cr  - Cx w/ resulted sensitivities. Will f/u ID recs for outpt abx  - likely dc 7/9    [facility]  Plastic & Reconstructive Surgery, PGY-3

## 2025-07-09 NOTE — DIETITIAN INITIAL EVALUATION ADULT - ORAL INTAKE PTA/DIET HISTORY
Patient reports she is not consuming hospital food , family bring food from home , patient reports she never eats outside of her home , told patient that a hospital has to provide a meal tray to her ,

## 2025-07-09 NOTE — PROGRESS NOTE ADULT - SUBJECTIVE AND OBJECTIVE BOX
Plastic Surgery Progress Note (pg [facility]: [id number], [place]: [phone])    SUBJECTIVE  The patient was seen and examined.     OBJECTIVE  ___________________________________________________  VITAL SIGNS / I&O's   Vital Signs Last 24 Hrs  T(C): 37 (09 Jul 2025 06:25), Max: 37.9 (08 Jul 2025 21:44)  T(F): 98.6 (09 Jul 2025 06:25), Max: 100.2 (08 Jul 2025 21:44)  HR: 76 (09 Jul 2025 06:25) (68 - 88)  BP: 98/56 (09 Jul 2025 06:25) (90/55 - 101/64)  BP(mean): --  RR: 16 (09 Jul 2025 06:25) (15 - 16)  SpO2: 96% (09 Jul 2025 06:25) (93% - 98%)    Parameters below as of 09 Jul 2025 06:25  Patient On (Oxygen Delivery Method): room air          08 Jul 2025 07:01  -  09 Jul 2025 07:00  --------------------------------------------------------  IN:    sodium chloride 0.9%: 1660 mL  Total IN: 1660 mL    OUT:    Bulb (mL): 20 mL  Total OUT: 20 mL    Total NET: 1640 mL        ___________________________________________________  PHYSICAL EXAM    Right breast exam unchanged  left breast with significant improvement in swelling and erythema. still moderate warmth laterally.   drain serosang.   both ADOLFO flaps viable    ___________________________________________________  LABS                        8.4    8.55  )-----------( 352      ( 08 Jul 2025 06:05 )             26.7     08 Jul 2025 12:20    138    |  103    |  29     ----------------------------<  105    3.8     |  29     |  1.85     Ca    9.2        08 Jul 2025 12:20  Mg     2.1       08 Jul 2025 06:05        CAPILLARY BLOOD GLUCOSE            Urinalysis Basic - ( 08 Jul 2025 12:20 )    Color: x / Appearance: x / SG: x / pH: x  Gluc: 105 mg/dL / Ketone: x  / Bili: x / Urobili: x   Blood: x / Protein: x / Nitrite: x   Leuk Esterase: x / RBC: x / WBC x   Sq Epi: x / Non Sq Epi: x / Bacteria: x      ___________________________________________________  MICRO  Recent Cultures:  Specimen Source: Surgical Swab Left Brest C&S #1 aerobic/anaerobic, 07-06 @ 13:40; Results   Few Staphylococcus aureus[!]; Gram Stain:   Rare polymorphonuclear leukocytes seen per low power field  No organisms seen per oil power field[!]; Organism: Staphylococcus aureus  Specimen Source: Aspirate Aspirate, 07-05 @ 10:20; Results   Numerous Staphylococcus aureus[!]; Gram Stain:   Numerous polymorphonuclear leukocytes per low power field  Moderate Gram Positive Cocci in Clusters per oil power field[!]; Organism: Staphylococcus aureus  Specimen Source: Clean Catch None, 07-04 @ 21:10; Results   No growth; Gram Stain: --; Organism: --  Specimen Source: Blood Blood-Peripheral, 07-04 @ 20:18; Results   No growth at 4 days; Gram Stain: --; Organism: --  Specimen Source: Blood Blood-Peripheral, 07-04 @ 20:15; Results   No growth at 4 days; Gram Stain: --; Organism: --    ___________________________________________________  MEDICATIONS  (STANDING):  cephalexin 500 milliGRAM(s) Oral four times a day  lactobacillus acidophilus 1 Tablet(s) Oral daily  sodium chloride 0.9%. 1000 milliLiter(s) (83 mL/Hr) IV Continuous <Continuous>    MEDICATIONS  (PRN):  acetaminophen     Tablet .. 650 milliGRAM(s) Oral every 6 hours PRN Mild Pain (1 - 3)  traMADol 50 milliGRAM(s) Oral every 6 hours PRN Moderate Pain (4 - 6)

## 2025-07-09 NOTE — PROGRESS NOTE ADULT - SUBJECTIVE AND OBJECTIVE BOX
Patient is a 49y old  Female who presents with a chief complaint of L breast cellulitis (09 Jul 2025 07:18)      Patient seen and examined at bedside. No overnight events reported. Patient states she is feeling better. Denies chest pain, sob, nausea, vomiting.     ALLERGIES:  No Known Allergies    MEDICATIONS  (STANDING):  cephalexin 500 milliGRAM(s) Oral four times a day  lactobacillus acidophilus 1 Tablet(s) Oral daily  sodium chloride 0.9%. 1000 milliLiter(s) (83 mL/Hr) IV Continuous <Continuous>    MEDICATIONS  (PRN):  acetaminophen     Tablet .. 650 milliGRAM(s) Oral every 6 hours PRN Mild Pain (1 - 3)  traMADol 50 milliGRAM(s) Oral every 6 hours PRN Moderate Pain (4 - 6)    Vital Signs Last 24 Hrs  T(F): 98.3 (09 Jul 2025 09:00), Max: 100.2 (08 Jul 2025 21:44)  HR: 72 (09 Jul 2025 09:00) (68 - 88)  BP: 100/66 (09 Jul 2025 09:00) (90/55 - 100/66)  RR: 16 (09 Jul 2025 09:00) (15 - 16)  SpO2: 95% (09 Jul 2025 09:00) (93% - 98%)  I&O's Summary    08 Jul 2025 07:01  -  09 Jul 2025 07:00  --------------------------------------------------------  IN: 1660 mL / OUT: 20 mL / NET: 1640 mL      PHYSICAL EXAM:  General: NAD, A/O x 3  ENT: No gross hearing impairment, Moist mucous membranes, no thrush  Neck: Supple, No JVD  Lungs: Clear to auscultation bilaterally, good air entry, non-labored breathing  Cardio: RRR, S1/S2, No murmur  Abdomen: Soft, Nontender, Nondistended; Bowel sounds present  Extremities: No calf tenderness, No cyanosis, No pitting edema  Psych: Appropriate mood and affect    LABS:                        8.0    9.83  )-----------( 290      ( 09 Jul 2025 07:10 )             25.3     07-09    142  |  108  |  21  ----------------------------<  106  3.9   |  26  |  1.80    Ca    8.8      09 Jul 2025 07:10  Mg     2.1     07-09                                        Urinalysis Basic - ( 09 Jul 2025 07:10 )    Color: x / Appearance: x / SG: x / pH: x  Gluc: 106 mg/dL / Ketone: x  / Bili: x / Urobili: x   Blood: x / Protein: x / Nitrite: x   Leuk Esterase: x / RBC: x / WBC x   Sq Epi: x / Non Sq Epi: x / Bacteria: x        Culture - Abscess with Gram Stain (collected 05 Jul 2025 10:20)  Source: Abscess left breast  Gram Stain (06 Jul 2025 00:45):    Moderate polymorphonuclear leukocytes seen per low power field    Few Gram Positive Cocci in Clusters seen per oil power field  Preliminary Report (06 Jul 2025 19:51):    Numerous Staphylococcus aureus  Organism: Staphylococcus aureus (07 Jul 2025 19:01)  Organism: Staphylococcus aureus (07 Jul 2025 19:01)      -  Clindamycin: S <=0.25      -  Oxacillin: S 0.5 Oxacillin predicts susceptibility for dicloxacillin, methicillin, and nafcillin      -  Gentamicin: S <=4 Should not be used as monotherapy      -  Vancomycin: S 1      -  Tetracycline: S <=4      Method Type: MICKY      -  Penicillin: R >2      -  Rifampin: S <=1 Should not be used as monotherapy      -  Erythromycin: R >4      -  Trimethoprim/Sulfamethoxazole: S <=0.5/9.5    Culture - Urine (collected 04 Jul 2025 21:10)  Source: Clean Catch None  Final Report (06 Jul 2025 12:36):    No growth    Culture - Blood (collected 04 Jul 2025 20:18)  Source: Blood Blood-Peripheral  Preliminary Report (09 Jul 2025 02:01):    No growth at 4 days    Culture - Blood (collected 04 Jul 2025 20:15)  Source: Blood Blood-Peripheral  Preliminary Report (09 Jul 2025 02:01):    No growth at 4 days        RADIOLOGY & ADDITIONAL TESTS:    Care Discussed with Consultants/Other Providers:

## 2025-07-09 NOTE — PROGRESS NOTE ADULT - SUBJECTIVE AND OBJECTIVE BOX
CC: f/u for  left breast abscess and cellulitis  Patient reports  feels much better  REVIEW OF SYSTEMS:  All other review of systems negative (Comprehensive ROS)    Antimicrobials Day #  :pod 2  cephalexin 500 milliGRAM(s) Oral four times a day    Other Medications Reviewed    T(F): 98.4 (07-09-25 @ 13:00), Max: 100.2 (07-08-25 @ 21:44)  HR: 88 (07-09-25 @ 13:00)  BP: 101/64 (07-09-25 @ 13:00)  RR: 16 (07-09-25 @ 13:00)  SpO2: 97% (07-09-25 @ 13:00)  Wt(kg): --    PHYSICAL EXAM:  General: alert, no acute distress  Eyes:  anicteric, no conjunctival injection, no discharge  Oropharynx: no lesions or injection 	  Neck: supple, without adenopathy  Lungs: clear to auscultation  Heart: regular rate and rhythm; no murmur, rubs or gallops  Abdomen: soft, nondistended, nontender, without mass or organomegaly. abdomen wound clean and intact  Skin: no lesions  Extremities: no clubbing, cyanosis, or edema  Neurologic: alert, oriented, moves all extremities  left nipple area with dry skin necrosis. rest of breat no red, same size as other breast, wound clean and intact  LAB RESULTS:                        8.0    9.83  )-----------( 290      ( 09 Jul 2025 07:10 )             25.3     07-09    142  |  108  |  21  ----------------------------<  106[H]  3.9   |  26  |  1.80[H]    Ca    8.8      09 Jul 2025 07:10  Mg     2.1     07-09        Urinalysis Basic - ( 09 Jul 2025 07:10 )    Color: x / Appearance: x / SG: x / pH: x  Gluc: 106 mg/dL / Ketone: x  / Bili: x / Urobili: x   Blood: x / Protein: x / Nitrite: x   Leuk Esterase: x / RBC: x / WBC x   Sq Epi: x / Non Sq Epi: x / Bacteria: x      MICROBIOLOGY:  RECENT CULTURES:  07-06 @ 13:40 Surgical Swab Left Brest C&S #1 aerobic/anaerobic Staphylococcus aureus    Few Staphylococcus aureus    Rare polymorphonuclear leukocytes seen per low power field  No organisms seen per oil power field    07-05 @ 10:20 Aspirate Aspirate Staphylococcus aureus    Numerous Staphylococcus aureus    Numerous polymorphonuclear leukocytes per low power field  Moderate Gram Positive Cocci in Clusters per oil power field    07-04 @ 21:10 Clean Catch None     No growth      07-04 @ 20:18 Blood Blood-Peripheral     No growth at 4 days      07-04 @ 20:15 Blood Blood-Peripheral     No growth at 4 days          RADIOLOGY REVIEWED:  < from: CT Abdomen and Pelvis w/ IV Cont (07.04.25 @ 21:16) >    ACC: [id number] EXAM:  CT ABDOMEN AND PELVIS IC   ORDERED BY: MICHELE JAY     ACC: [id number] EXAM:  CT CHEST IC   ORDERED BY: MICHELE JAY     PROCEDURE DATE:  07/04/2025          INTERPRETATION:  CLINICAL INFORMATION: Status post hysterectomy with flap   on 6/17/2025, rule out abscess    COMPARISON: None.    CONTRAST/COMPLICATIONS:  IV Contrast: Omnipaque 350  90 cc administered   10 cc discarded  Oral Contrast: NONE.    PROCEDURE:  CT of the Chest, Abdomen and Pelvis was performed.  Sagittal and coronal reformats were performed.    FINDINGS:  CHEST:  LUNGS AND LARGE AIRWAYS: Patent central airways. No consolidations.  PLEURA: No pleural effusion.  VESSELS: Within normal limits.  HEART: Heart size is normal. No pericardial effusion.  MEDIASTINUM AND ANDIE: No lymphadenopathy.  CHEST WALL AND LOWER NECK: Subcentimeter right thyroid lobe nodule.   Sequelae of bilateral mastectomies with flap reconstruction. At the   lateral aspect of the left breast there is a 3.3 x 2.5 x 4.9 cm   collection with surrounding fat stranding. At the medial aspect of the   left breast there is a small amount of subcutaneous fluid without   definite organization.    ABDOMEN AND PELVIS:  LIVER: Within normal limits.  BILE DUCTS: Normal caliber.  GALLBLADDER: Collapsed gallbladder with cholelithiasis.  SPLEEN: Within normal limits.  PANCREAS: Within normal limits.  ADRENALS: Within normal limits.  KIDNEYS/URETERS: No hydronephrosis. Bilateral too small to characterize   hypodense foci. Right renal cyst. 1 cm right lower pole nonobstructing   renal calculus.    BLADDER: Within normal limits.  REPRODUCTIVE ORGANS: Uterus and adnexa within normal limits.    BOWEL: No bowel obstruction. Appendix is normal.  PERITONEUM/RETROPERITONEUM: Within normal limits.  VESSELS: Within normal limits.  LYMPH NODES: No lymphadenopathy.  ABDOMINAL WALL: Postsurgical changes. No organized abdominal wall fluid   collections. Possible small postoperative left rectus sheath hematoma..  BONES: No acute osseous abnormality.    IMPRESSION:  Sequelae of bilateral mastectomies with flap reconstruction.    At the lateral aspect of the left breast there is a 3.3 x 2.5 x 4.9 cm   collection with surrounding fat stranding, likely postoperative   hematoma/seroma, superimposed infection is not definitively excluded.    At the medial aspect of the left breast there is a small amount of   subcutaneous fluid without definite organization.        --- End of Report ---          < end of copied text >              Assessment:  Patient s/p recent mastectomy and obinna flap, developed redness and pain of the left breast just after the drain was pulled . She did not respond to po keflex so came in a few days ago and now s/p washout of the flap and doing much better. RICARDO suspect from vanco. Infection now well controlled, grew mssa so po keflex is fine.   Plan:  continue keflex, would do another 8 d  f/u renal fxn

## 2025-07-09 NOTE — PROGRESS NOTE ADULT - SUBJECTIVE AND OBJECTIVE BOX
No CP, SOB    Vital Signs Last 24 Hrs  T(C): 36.5 (07-09-25 @ 18:00), Max: 37.2 (07-09-25 @ 02:00)  T(F): 97.7 (07-09-25 @ 18:00), Max: 99 (07-09-25 @ 02:00)  HR: 79 (07-09-25 @ 18:00) (72 - 88)  BP: 94/50 (07-09-25 @ 18:00) (94/50 - 101/64)  RR: 16 (07-09-25 @ 18:00) (16 - 16)  SpO2: 95% (07-09-25 @ 18:00) (95% - 97%)    I&O's Detail    08 Jul 2025 07:01  -  09 Jul 2025 07:00  --------------------------------------------------------  IN:    sodium chloride 0.9%: 1660 mL  Total IN: 1660 mL    OUT:    Bulb (mL): 20 mL  Total OUT: 20 mL    09 Jul 2025 07:01  -  09 Jul 2025 22:43  --------------------------------------------------------  IN:    sodium chloride 0.9%: 415 mL    sodium chloride 0.9%: 300 mL  Total IN: 715 mL    OUT:    Bulb (mL): 9 mL  Total OUT: 9 mL    s1s2  b/l air entry  soft, ND  no edema                        8.0    9.83  )-----------( 290      ( 09 Jul 2025 07:10 )             25.3     09 Jul 2025 07:10    142    |  108    |  21     ----------------------------<  106    3.9     |  26     |  1.80     Ca    8.8        09 Jul 2025 07:10  Mg     2.1       09 Jul 2025 07:10    acetaminophen     Tablet .. 650 milliGRAM(s) Oral every 6 hours PRN  cephalexin 500 milliGRAM(s) Oral four times a day  lactobacillus acidophilus 1 Tablet(s) Oral daily  ondansetron Injectable 4 milliGRAM(s) IV Push three times a day PRN  sodium chloride 0.9%. 1000 milliLiter(s) IV Continuous <Continuous>  traMADol 50 milliGRAM(s) Oral every 6 hours PRN    A/P:    S/p b/l mastectomy and ADOLFO on 6/17  L breast cellulitis s/p wash out in OR 7/6  Etiology of RICARDO not obvious  Possibly septic/hemodynamic and/or Vanco related  Continue IV hydration  F/u BMP, UO  Pls send UA  Avoid nephrotoxins    [phone]

## 2025-07-09 NOTE — DIETITIAN INITIAL EVALUATION ADULT - HEIGHT FOR BMI (INCHES)
1 AJM: Pt feeling inmproved. seen by neuro who obtaine dhistory of pt having similar symptoms in the past. normal exam for them. not concerned for central process at this time. pending results of head ct. likely dc home with meclizine. Omer: Called radiology regarding CT read. Awaiting attending overread. Pt and family made aware of delays. Pt remains symptomatically improved. Anticipate dc home pending CT. Omer: CTH negative. Pt ambulatory with a steady gait, tolerating PO without recurrence of symptoms.

## 2025-07-09 NOTE — DIETITIAN INITIAL EVALUATION ADULT - PERTINENT MEDS FT
MEDICATIONS  (STANDING):  cephalexin 500 milliGRAM(s) Oral four times a day  lactobacillus acidophilus 1 Tablet(s) Oral daily  sodium chloride 0.9%. 1000 milliLiter(s) (83 mL/Hr) IV Continuous <Continuous>    MEDICATIONS  (PRN):  acetaminophen     Tablet .. 650 milliGRAM(s) Oral every 6 hours PRN Mild Pain (1 - 3)  traMADol 50 milliGRAM(s) Oral every 6 hours PRN Moderate Pain (4 - 6)

## 2025-07-09 NOTE — PROGRESS NOTE ADULT - ASSESSMENT
48 y/o female w/ PMH of nephrolithiasis, left breast cancer s/p B/L mastectomy and ADOLFO on 6/17/2025 with left breast cellulitis and collection likely hematoma/seroma per CT and acute anemia.    #Sepsis (present on admission; , WBC 13.4) due to left breast abscess and cellulitis  #POD #3 from washout  - Pt. is > 2.5 weeks s/p bilateral mastectomy and ADOLFO flap  - Appreciate surgery and ID consult teams  - CT C/A/P: lateral left breast - 3.3 x 2.5 x 4.9 cm collection with surrounding fat stranding, likely postop hematoma/seroma, superimposed infection is not definitively excluded. At the medial aspect of the left breast there is a small amount of subcutaneous fluid without definite organization  - 7/6 s/p of purulent fluid was aspirated from the lateral chest pocket by surgical PA > cultures sent > growing gram staph aureus > sensitivities reviewed  - 7/4 blood cultures no growth at 4 days  - Stopped vancomycin yesterday due to RICARDO  - Patient clinically looks ok on Keflex, will ask ID f/u now that sensitivities back   - c/w tylenol as needed for pain    #RICARDO  - Noted creatinine 1.8 this am   - Dr Devine following   - Stopped vancomycin, continue IVF  - BMP in AM  - Avoid nephrotoxic agents    # Left breast seroma/hematoma  # Anemia  - Hgb 8.0 this am, s/p 1U PRBC   - Keep active T+S  - Trend CBC    #Hypokalemia-resolved  - Will monitor    # VTE ppx: SCDs for now    # GOC: Full code         48 y/o female w/ PMH of nephrolithiasis, left breast cancer s/p B/L mastectomy and ADOLFO on 6/17/2025 with left breast cellulitis and collection likely hematoma/seroma per CT and acute anemia.    #Sepsis (present on admission; , WBC 13.4) due to left breast abscess and cellulitis  #POD #3 from washout  - Pt. is > 2.5 weeks s/p bilateral mastectomy and ADOLFO flap  - Appreciate surgery and ID consult teams  - CT C/A/P: lateral left breast - 3.3 x 2.5 x 4.9 cm collection with surrounding fat stranding, likely postop hematoma/seroma, superimposed infection is not definitively excluded. At the medial aspect of the left breast there is a small amount of subcutaneous fluid without definite organization  - 7/6 s/p of purulent fluid was aspirated from the lateral chest pocket by surgical PA > cultures sent > growing gram staph aureus > sensitivities reviewed  - 7/4 blood cultures no growth at 4 days  - Stopped vancomycin yesterday due to RICARDO  - Patient clinically looks ok on Keflex, will ask ID f/u now that sensitivities back   - c/w tylenol as needed for pain    #RICARDO  - Noted creatinine 1.8 this am   - Dr Devine following   - Stopped vancomycin, continue IVF  - BMP in AM  - Avoid nephrotoxic agents    # Left breast seroma/hematoma  # Anemia  - Hgb 8.0 this am, s/p 1U PRBC   - Keep active T+S  - Trend CBC    #Hypokalemia-resolved  - Will monitor    # VTE ppx: SCDs for now    # GOC: Full code

## 2025-07-10 ENCOUNTER — TRANSCRIPTION ENCOUNTER (OUTPATIENT)
Age: 50
End: 2025-07-10

## 2025-07-10 LAB
ANION GAP SERPL CALC-SCNC: 7 MMOL/L — SIGNIFICANT CHANGE UP (ref 5–17)
ANION GAP SERPL CALC-SCNC: 8 MMOL/L — SIGNIFICANT CHANGE UP (ref 5–17)
APPEARANCE UR: CLEAR — SIGNIFICANT CHANGE UP
BILIRUB UR-MCNC: NEGATIVE — SIGNIFICANT CHANGE UP
BUN SERPL-MCNC: 20 MG/DL — SIGNIFICANT CHANGE UP (ref 7–23)
BUN SERPL-MCNC: 21 MG/DL — SIGNIFICANT CHANGE UP (ref 7–23)
CALCIUM SERPL-MCNC: 8.7 MG/DL — SIGNIFICANT CHANGE UP (ref 8.4–10.5)
CALCIUM SERPL-MCNC: 9.1 MG/DL — SIGNIFICANT CHANGE UP (ref 8.4–10.5)
CHLORIDE SERPL-SCNC: 105 MMOL/L — SIGNIFICANT CHANGE UP (ref 96–108)
CHLORIDE SERPL-SCNC: 106 MMOL/L — SIGNIFICANT CHANGE UP (ref 96–108)
CK SERPL-CCNC: 20 U/L — LOW (ref 25–170)
CO2 SERPL-SCNC: 26 MMOL/L — SIGNIFICANT CHANGE UP (ref 22–31)
CO2 SERPL-SCNC: 28 MMOL/L — SIGNIFICANT CHANGE UP (ref 22–31)
COLOR SPEC: YELLOW — SIGNIFICANT CHANGE UP
CREAT SERPL-MCNC: 1.74 MG/DL — HIGH (ref 0.5–1.3)
CREAT SERPL-MCNC: 1.89 MG/DL — HIGH (ref 0.5–1.3)
CULTURE RESULTS: ABNORMAL
CULTURE RESULTS: ABNORMAL
CULTURE RESULTS: SIGNIFICANT CHANGE UP
CULTURE RESULTS: SIGNIFICANT CHANGE UP
DIFF PNL FLD: NEGATIVE — SIGNIFICANT CHANGE UP
EGFR: 32 ML/MIN/1.73M2 — LOW
EGFR: 32 ML/MIN/1.73M2 — LOW
EGFR: 36 ML/MIN/1.73M2 — LOW
EGFR: 36 ML/MIN/1.73M2 — LOW
EPI CELLS # UR: PRESENT
GLUCOSE SERPL-MCNC: 101 MG/DL — HIGH (ref 70–99)
GLUCOSE SERPL-MCNC: 123 MG/DL — HIGH (ref 70–99)
GLUCOSE UR QL: NEGATIVE MG/DL — SIGNIFICANT CHANGE UP
HCT VFR BLD CALC: 24 % — LOW (ref 34.5–45)
HGB BLD-MCNC: 7.5 G/DL — LOW (ref 11.5–15.5)
KETONES UR QL: NEGATIVE MG/DL — SIGNIFICANT CHANGE UP
LEUKOCYTE ESTERASE UR-ACNC: ABNORMAL
MCHC RBC-ENTMCNC: 25.7 PG — LOW (ref 27–34)
MCHC RBC-ENTMCNC: 31.3 G/DL — LOW (ref 32–36)
MCV RBC AUTO: 82.2 FL — SIGNIFICANT CHANGE UP (ref 80–100)
NITRITE UR-MCNC: NEGATIVE — SIGNIFICANT CHANGE UP
NRBC BLD AUTO-RTO: 0 /100 WBCS — SIGNIFICANT CHANGE UP (ref 0–0)
ORGANISM # SPEC MICROSCOPIC CNT: ABNORMAL
ORGANISM # SPEC MICROSCOPIC CNT: ABNORMAL
ORGANISM # SPEC MICROSCOPIC CNT: SIGNIFICANT CHANGE UP
ORGANISM # SPEC MICROSCOPIC CNT: SIGNIFICANT CHANGE UP
PH UR: 6.5 — SIGNIFICANT CHANGE UP (ref 5–8)
PLATELET # BLD AUTO: 262 K/UL — SIGNIFICANT CHANGE UP (ref 150–400)
POTASSIUM SERPL-MCNC: 3.6 MMOL/L — SIGNIFICANT CHANGE UP (ref 3.5–5.3)
POTASSIUM SERPL-MCNC: 3.6 MMOL/L — SIGNIFICANT CHANGE UP (ref 3.5–5.3)
POTASSIUM SERPL-SCNC: 3.6 MMOL/L — SIGNIFICANT CHANGE UP (ref 3.5–5.3)
POTASSIUM SERPL-SCNC: 3.6 MMOL/L — SIGNIFICANT CHANGE UP (ref 3.5–5.3)
PROT UR-MCNC: NEGATIVE MG/DL — SIGNIFICANT CHANGE UP
RBC # BLD: 2.92 M/UL — LOW (ref 3.8–5.2)
RBC # FLD: 15.1 % — HIGH (ref 10.3–14.5)
RBC CASTS # UR COMP ASSIST: 1 /HPF — SIGNIFICANT CHANGE UP (ref 0–4)
SODIUM SERPL-SCNC: 140 MMOL/L — SIGNIFICANT CHANGE UP (ref 135–145)
SODIUM SERPL-SCNC: 140 MMOL/L — SIGNIFICANT CHANGE UP (ref 135–145)
SODIUM UR-SCNC: 78 MMOL/L — SIGNIFICANT CHANGE UP
SP GR SPEC: 1 — SIGNIFICANT CHANGE UP (ref 1–1.03)
SPECIMEN SOURCE: SIGNIFICANT CHANGE UP
UROBILINOGEN FLD QL: 0.2 MG/DL — SIGNIFICANT CHANGE UP (ref 0.2–1)
VANCOMYCIN FLD-MCNC: 11.9 UG/ML — SIGNIFICANT CHANGE UP
WBC # BLD: 7.9 K/UL — SIGNIFICANT CHANGE UP (ref 3.8–10.5)
WBC # FLD AUTO: 7.9 K/UL — SIGNIFICANT CHANGE UP (ref 3.8–10.5)
WBC UR QL: 3 /HPF — SIGNIFICANT CHANGE UP (ref 0–5)

## 2025-07-10 PROCEDURE — 99233 SBSQ HOSP IP/OBS HIGH 50: CPT

## 2025-07-10 RX ORDER — DOXYCYCLINE HYCLATE 100 MG
100 TABLET ORAL EVERY 12 HOURS
Refills: 0 | Status: DISCONTINUED | OUTPATIENT
Start: 2025-07-10 | End: 2025-07-12

## 2025-07-10 RX ADMIN — Medication 1 TABLET(S): at 12:17

## 2025-07-10 RX ADMIN — Medication 650 MILLIGRAM(S): at 14:13

## 2025-07-10 RX ADMIN — CEPHALEXIN 500 MILLIGRAM(S): 250 CAPSULE ORAL at 12:17

## 2025-07-10 RX ADMIN — CEPHALEXIN 500 MILLIGRAM(S): 250 CAPSULE ORAL at 05:06

## 2025-07-10 RX ADMIN — Medication 100 MILLIGRAM(S): at 18:39

## 2025-07-10 RX ADMIN — TRAMADOL HYDROCHLORIDE 50 MILLIGRAM(S): 50 TABLET, FILM COATED ORAL at 22:11

## 2025-07-10 NOTE — PROGRESS NOTE ADULT - ASSESSMENT
Patient with left breast infection after HyPAD ADOLFO flap.       - POD4 from washout, pt currently AVSS. Exam much improved. Erythema largely resolved   - f/u AM labs, Cr downtrending  - pt to complete 8d course of keflex  - dc per med/nephro    Robby Smyth  Plastic & Reconstructive Surgery, PGY-3

## 2025-07-10 NOTE — DISCHARGE NOTE NURSING/CASE MANAGEMENT/SOCIAL WORK - NSDCPEFALRISK_GEN_ALL_CORE
For information on Fall & Injury Prevention, visit: [email or web address] OR  [email or web address] OR  https://www.cdc.gov/steadi/patient.html

## 2025-07-10 NOTE — PROGRESS NOTE ADULT - SUBJECTIVE AND OBJECTIVE BOX
spoke to Dr. Devine. She has some concern that patient has alexis related to beta lactam so will change to doxycycline

## 2025-07-10 NOTE — PROGRESS NOTE ADULT - SUBJECTIVE AND OBJECTIVE BOX
Plastic Surgery Progress Note (pg [facility]: [id number], [facility]: [phone])    SUBJECTIVE  The patient was seen and examined.     OBJECTIVE  ___________________________________________________  VITAL SIGNS / I&O's   Vital Signs Last 24 Hrs  T(C): 36.7 (10 Jul 2025 06:54), Max: 36.9 (09 Jul 2025 13:00)  T(F): 98.1 (10 Jul 2025 06:54), Max: 98.4 (09 Jul 2025 13:00)  HR: 68 (10 Jul 2025 06:54) (68 - 88)  BP: 99/63 (10 Jul 2025 06:54) (94/50 - 101/64)  BP(mean): --  RR: 16 (10 Jul 2025 06:54) (16 - 16)  SpO2: 96% (10 Jul 2025 06:54) (95% - 97%)    Parameters below as of 10 Jul 2025 06:54  Patient On (Oxygen Delivery Method): room air          09 Jul 2025 07:01  -  10 Jul 2025 07:00  --------------------------------------------------------  IN:    sodium chloride 0.9%: 415 mL    sodium chloride 0.9%: 1500 mL  Total IN: 1915 mL    OUT:    Bulb (mL): 14 mL  Total OUT: 14 mL    Total NET: 1901 mL        ___________________________________________________  PHYSICAL EXAM    General: alert, no acute distress  Eyes:  anicteric, no conjunctival injection, no discharge  Oropharynx: no lesions or injection 	  Neck: supple, without adenopathy  Lungs: clear to auscultation  Heart: regular rate and rhythm; no murmur, rubs or gallops  Abdomen: soft, nondistended, nontender, without mass or organomegaly. abdomen wound clean and intact  Skin: no lesions  Extremities: no clubbing, cyanosis, or edema  Neurologic: alert, oriented, moves all extremities  left nipple area with dry skin necrosis. rest of breat no red, same size as other breast, wound clean and intact    ___________________________________________________  LABS                        7.5    7.90  )-----------( 262      ( 10 Jul 2025 06:10 )             24.0     10 Jul 2025 06:10    140    |  106    |  21     ----------------------------<  101    3.6     |  26     |  1.74     Ca    8.7        10 Jul 2025 06:10  Mg     2.1       09 Jul 2025 07:10        CAPILLARY BLOOD GLUCOSE            Urinalysis Basic - ( 10 Jul 2025 06:10 )    Color: x / Appearance: x / SG: x / pH: x  Gluc: 101 mg/dL / Ketone: x  / Bili: x / Urobili: x   Blood: x / Protein: x / Nitrite: x   Leuk Esterase: x / RBC: x / WBC x   Sq Epi: x / Non Sq Epi: x / Bacteria: x      ___________________________________________________  MICRO  Recent Cultures:  Specimen Source: Surgical Swab Left Brest C&S #1 aerobic/anaerobic, 07-06 @ 13:40; Results   Few Staphylococcus aureus[!]; Gram Stain:   Rare polymorphonuclear leukocytes seen per low power field  No organisms seen per oil power field[!]; Organism: Staphylococcus aureus  Specimen Source: Aspirate Aspirate, 07-05 @ 10:20; Results   Numerous Staphylococcus aureus[!]; Gram Stain:   Numerous polymorphonuclear leukocytes per low power field  Moderate Gram Positive Cocci in Clusters per oil power field[!]; Organism: Staphylococcus aureus  Specimen Source: Clean Catch None, 07-04 @ 21:10; Results   No growth; Gram Stain: --; Organism: --  Specimen Source: Blood Blood-Peripheral, 07-04 @ 20:18; Results   No growth at 5 days; Gram Stain: --; Organism: --  Specimen Source: Blood Blood-Peripheral, 07-04 @ 20:15; Results   No growth at 5 days; Gram Stain: --; Organism: --    ___________________________________________________  MEDICATIONS  (STANDING):  cephalexin 500 milliGRAM(s) Oral four times a day  lactobacillus acidophilus 1 Tablet(s) Oral daily  sodium chloride 0.9%. 1000 milliLiter(s) (100 mL/Hr) IV Continuous <Continuous>    MEDICATIONS  (PRN):  acetaminophen     Tablet .. 650 milliGRAM(s) Oral every 6 hours PRN Mild Pain (1 - 3)  ondansetron Injectable 4 milliGRAM(s) IV Push three times a day PRN Nausea and/or Vomiting  traMADol 50 milliGRAM(s) Oral every 6 hours PRN Moderate Pain (4 - 6)

## 2025-07-10 NOTE — PROGRESS NOTE ADULT - SUBJECTIVE AND OBJECTIVE BOX
No CP, SOB    Vital Signs Last 24 Hrs  T(C): 36.5 (07-10-25 @ 14:00), Max: 36.8 (07-10-25 @ 02:00)  T(F): 97.7 (07-10-25 @ 14:00), Max: 98.2 (07-10-25 @ 02:00)  HR: 85 (07-10-25 @ 14:00) (68 - 85)  BP: 110/66 (07-10-25 @ 14:00) (99/63 - 115/73)  RR: 16 (07-10-25 @ 14:00) (16 - 18)  SpO2: 98% (07-10-25 @ 14:00) (95% - 98%)    I&O's Detail    09 Jul 2025 07:01  -  10 Jul 2025 07:00  --------------------------------------------------------  IN:    sodium chloride 0.9%: 415 mL    sodium chloride 0.9%: 1500 mL  Total IN: 1915 mL    OUT:    Bulb (mL): 14 mL    10 Jul 2025 07:01  -  10 Jul 2025 19:34  --------------------------------------------------------  OUT:    Bulb (mL): 9 mL    s1s2  b/l air entry  soft, ND  no edema                               7.5    7.90  )-----------( 262      ( 10 Jul 2025 06:10 )             24.0     10 Jul 2025 18:45    140    |  105    |  20     ----------------------------<  123    3.6     |  28     |  1.89     Ca    9.1        10 Jul 2025 18:45  Mg     2.1       09 Jul 2025 07:10    acetaminophen     Tablet .. 650 milliGRAM(s) Oral every 6 hours PRN  doxycycline monohydrate Capsule 100 milliGRAM(s) Oral every 12 hours  lactobacillus acidophilus 1 Tablet(s) Oral daily  ondansetron Injectable 4 milliGRAM(s) IV Push three times a day PRN  sodium chloride 0.9%. 1000 milliLiter(s) IV Continuous <Continuous>  traMADol 50 milliGRAM(s) Oral every 6 hours PRN    A/P:    S/p b/l mastectomy and ADOLFO on 6/17  L breast cellulitis s/p wash out in OR 7/6  RICARDO possibly septic/hemodynamic and/or Vanco related  ID input appr, Abx per ID   UA bland  Cr is improving slowly   Continue IV hydration for another day   F/u BMP in am  Avoid nephrotoxins, no NSAID's, d/w pt   No objection to d/c in am from renal pov, w/op f/u, as long as Cr is improving  D/w medicine     [phone]

## 2025-07-10 NOTE — DISCHARGE NOTE NURSING/CASE MANAGEMENT/SOCIAL WORK - PATIENT PORTAL LINK FT
You can access the FollowMyHealth Patient Portal offered by [facility] by registering at the following website: [email or web address]. By joining [facility]’s FollowMyHealth portal, you will also be able to view your health information using other applications (apps) compatible with our system.

## 2025-07-10 NOTE — CHART NOTE - NSCHARTNOTEFT_GEN_A_CORE
Called by RN, alerted of critical value Hgb 6.9. BP 94/59. HR 93. Pt seen and evaluated at bedside. No acute distress. Left breast erythematous, tense. Pt states that breast isn't anymore enlarged than it was this morning and feels breast size has improved. 1 unit of Packed red blood cells ordered. Modified protonix order to IV. Awaiting stool guaiac. Plan to repeat Hgb 2 hrs post transfusion completion.
Notified by RN that patient's vancomycin trough was 6.8. Spoke with pharmacy who recommended dose change to 1250 q8h. Order placed.
Checked BMP, Cr 1.89. Continue with IV fluids, avoid nephrotoxins. Follow up Am labs.

## 2025-07-10 NOTE — PROGRESS NOTE ADULT - ASSESSMENT
48 y/o female w/ PMH of nephrolithiasis, left breast cancer s/p B/L mastectomy and ADOLFO on 6/17/2025 with left breast cellulitis and collection likely hematoma/seroma per CT and acute anemia.    #Sepsis (present on admission; , WBC 13.4) due to left breast abscess and cellulitis  #POD #3 from washout  - Pt. is > 2.5 weeks s/p bilateral mastectomy and ADOLFO flap  - Appreciate surgery and ID consult teams  - CT C/A/P: lateral left breast - 3.3 x 2.5 x 4.9 cm collection with surrounding fat stranding, likely postop hematoma/seroma, superimposed infection is not definitively excluded. At the medial aspect of the left breast there is a small amount of subcutaneous fluid without definite organization  - 7/6 s/p of purulent fluid was aspirated from the lateral chest pocket by surgical PA > cultures sent > growing gram staph aureus > sensitivities reviewed  - 7/4 blood cultures no growth at 4 days  - Stopped vancomycin due to RICARDO  - Patient clinically looks ok on Keflex, renal and ID agreed to switch to doxy given RICARDO   - c/w tylenol as needed for pain    #RICARDO  - Noted creatinine 1.7 this am   - Dr Devine following   - Stopped vancomycin, continue IVF  - BMP in AM  - Avoid nephrotoxic agents    # Left breast seroma/hematoma  # Anemia  - Hgb 8.0 this am, s/p 1U PRBC   - Keep active T+S  - Trend CBC    #Hypokalemia-resolved  - Will monitor    # VTE ppx: SCDs for now    # GOC: Full code     Spoke to patient PMD Dr. Cheng as per her request

## 2025-07-10 NOTE — PROGRESS NOTE ADULT - SUBJECTIVE AND OBJECTIVE BOX
PROGRESS NOTE:     Patient is a 49y old  Female who presents with a chief complaint of L breast cellulitis (10 Jul 2025 13:27)      SUBJECTIVE / OVERNIGHT EVENTS: pt was seen and evaluated today  no complains offered     ADDITIONAL REVIEW OF SYSTEMS:  as per HPI    MEDICATIONS  (STANDING):  doxycycline monohydrate Capsule 100 milliGRAM(s) Oral every 12 hours  lactobacillus acidophilus 1 Tablet(s) Oral daily  sodium chloride 0.9%. 1000 milliLiter(s) (100 mL/Hr) IV Continuous <Continuous>    MEDICATIONS  (PRN):  acetaminophen     Tablet .. 650 milliGRAM(s) Oral every 6 hours PRN Mild Pain (1 - 3)  ondansetron Injectable 4 milliGRAM(s) IV Push three times a day PRN Nausea and/or Vomiting  traMADol 50 milliGRAM(s) Oral every 6 hours PRN Moderate Pain (4 - 6)      CAPILLARY BLOOD GLUCOSE        I&O's Summary    09 Jul 2025 07:01  -  10 Jul 2025 07:00  --------------------------------------------------------  IN: 1915 mL / OUT: 14 mL / NET: 1901 mL        PHYSICAL EXAM:  Vital Signs Last 24 Hrs  T(C): 36.5 (10 Jul 2025 14:00), Max: 36.8 (10 Jul 2025 02:00)  T(F): 97.7 (10 Jul 2025 14:00), Max: 98.2 (10 Jul 2025 02:00)  HR: 85 (10 Jul 2025 14:00) (68 - 85)  BP: 110/66 (10 Jul 2025 14:00) (94/50 - 115/73)  BP(mean): --  RR: 16 (10 Jul 2025 14:00) (16 - 18)  SpO2: 98% (10 Jul 2025 14:00) (95% - 98%)    Parameters below as of 10 Jul 2025 14:00  Patient On (Oxygen Delivery Method): room air    PHYSICAL EXAM:  General: NAD, A/O x 3  ENT: No gross hearing impairment, Moist mucous membranes, no thrush  Neck: Supple, No JVD  Lungs: Clear to auscultation bilaterally, good air entry, non-labored breathing  Cardio: RRR, S1/S2, No murmur  Abdomen: Soft, Nontender, Nondistended; Bowel sounds present  Extremities: No calf tenderness, No cyanosis, No pitting edema  Psych: Appropriate mood and affect    LABS:                        7.5    7.90  )-----------( 262      ( 10 Jul 2025 06:10 )             24.0     07-10    140  |  106  |  21  ----------------------------<  101[H]  3.6   |  26  |  1.74[H]    Ca    8.7      10 Jul 2025 06:10  Mg     2.1     07-09            Urinalysis Basic - ( 10 Jul 2025 06:10 )    Color: x / Appearance: x / SG: x / pH: x  Gluc: 101 mg/dL / Ketone: x  / Bili: x / Urobili: x   Blood: x / Protein: x / Nitrite: x   Leuk Esterase: x / RBC: x / WBC x   Sq Epi: x / Non Sq Epi: x / Bacteria: x          RADIOLOGY & ADDITIONAL TESTS:  Results Reviewed:   Imaging Personally Reviewed:  Electrocardiogram Personally Reviewed:    COORDINATION OF CARE:  Care Discussed with Consultants/Other Providers [Y/N]:  Prior or Outpatient Records Reviewed [Y/N]:

## 2025-07-10 NOTE — DISCHARGE NOTE NURSING/CASE MANAGEMENT/SOCIAL WORK - FINANCIAL ASSISTANCE
[facility] provides services at a reduced cost to those who are determined to be eligible through [facility]’s financial assistance program. Information regarding [facility]’s financial assistance program can be found by going to [email or web address] or by calling [phone].

## 2025-07-11 ENCOUNTER — APPOINTMENT (OUTPATIENT)
Dept: PLASTIC SURGERY | Facility: CLINIC | Age: 50
End: 2025-07-11

## 2025-07-11 LAB
ANION GAP SERPL CALC-SCNC: 4 MMOL/L — LOW (ref 5–17)
ANION GAP SERPL CALC-SCNC: 8 MMOL/L — SIGNIFICANT CHANGE UP (ref 5–17)
BASOPHILS # BLD AUTO: 0.03 K/UL — SIGNIFICANT CHANGE UP (ref 0–0.2)
BASOPHILS NFR BLD AUTO: 0.4 % — SIGNIFICANT CHANGE UP (ref 0–2)
BUN SERPL-MCNC: 19 MG/DL — SIGNIFICANT CHANGE UP (ref 7–23)
BUN SERPL-MCNC: 20 MG/DL — SIGNIFICANT CHANGE UP (ref 7–23)
CALCIUM SERPL-MCNC: 9.2 MG/DL — SIGNIFICANT CHANGE UP (ref 8.4–10.5)
CALCIUM SERPL-MCNC: 9.2 MG/DL — SIGNIFICANT CHANGE UP (ref 8.4–10.5)
CHLORIDE SERPL-SCNC: 106 MMOL/L — SIGNIFICANT CHANGE UP (ref 96–108)
CHLORIDE SERPL-SCNC: 108 MMOL/L — SIGNIFICANT CHANGE UP (ref 96–108)
CO2 SERPL-SCNC: 27 MMOL/L — SIGNIFICANT CHANGE UP (ref 22–31)
CO2 SERPL-SCNC: 30 MMOL/L — SIGNIFICANT CHANGE UP (ref 22–31)
CREAT SERPL-MCNC: 1.73 MG/DL — HIGH (ref 0.5–1.3)
CREAT SERPL-MCNC: 1.93 MG/DL — HIGH (ref 0.5–1.3)
CULTURE RESULTS: ABNORMAL
EGFR: 32 ML/MIN/1.73M2 — LOW
EGFR: 32 ML/MIN/1.73M2 — LOW
EGFR: 36 ML/MIN/1.73M2 — LOW
EGFR: 36 ML/MIN/1.73M2 — LOW
EOSINOPHIL # BLD AUTO: 0.07 K/UL — SIGNIFICANT CHANGE UP (ref 0–0.5)
EOSINOPHIL NFR BLD AUTO: 0.9 % — SIGNIFICANT CHANGE UP (ref 0–6)
GLUCOSE SERPL-MCNC: 100 MG/DL — HIGH (ref 70–99)
GLUCOSE SERPL-MCNC: 126 MG/DL — HIGH (ref 70–99)
HCT VFR BLD CALC: 26.4 % — LOW (ref 34.5–45)
HCT VFR BLD CALC: 26.6 % — LOW (ref 34.5–45)
HGB BLD-MCNC: 8.3 G/DL — LOW (ref 11.5–15.5)
HGB BLD-MCNC: 8.4 G/DL — LOW (ref 11.5–15.5)
IMM GRANULOCYTES NFR BLD AUTO: 0.5 % — SIGNIFICANT CHANGE UP (ref 0–0.9)
LYMPHOCYTES # BLD AUTO: 0.93 K/UL — LOW (ref 1–3.3)
LYMPHOCYTES # BLD AUTO: 12.2 % — LOW (ref 13–44)
MAGNESIUM SERPL-MCNC: 1.9 MG/DL — SIGNIFICANT CHANGE UP (ref 1.6–2.6)
MCHC RBC-ENTMCNC: 25.4 PG — LOW (ref 27–34)
MCHC RBC-ENTMCNC: 25.8 PG — LOW (ref 27–34)
MCHC RBC-ENTMCNC: 31.2 G/DL — LOW (ref 32–36)
MCHC RBC-ENTMCNC: 31.8 G/DL — LOW (ref 32–36)
MCV RBC AUTO: 81.2 FL — SIGNIFICANT CHANGE UP (ref 80–100)
MCV RBC AUTO: 81.3 FL — SIGNIFICANT CHANGE UP (ref 80–100)
MONOCYTES # BLD AUTO: 0.55 K/UL — SIGNIFICANT CHANGE UP (ref 0–0.9)
MONOCYTES NFR BLD AUTO: 7.2 % — SIGNIFICANT CHANGE UP (ref 2–14)
NEUTROPHILS # BLD AUTO: 6 K/UL — SIGNIFICANT CHANGE UP (ref 1.8–7.4)
NEUTROPHILS NFR BLD AUTO: 78.8 % — HIGH (ref 43–77)
NRBC BLD AUTO-RTO: 0 /100 WBCS — SIGNIFICANT CHANGE UP (ref 0–0)
NRBC BLD AUTO-RTO: 0 /100 WBCS — SIGNIFICANT CHANGE UP (ref 0–0)
ORGANISM # SPEC MICROSCOPIC CNT: ABNORMAL
ORGANISM # SPEC MICROSCOPIC CNT: SIGNIFICANT CHANGE UP
PLATELET # BLD AUTO: 289 K/UL — SIGNIFICANT CHANGE UP (ref 150–400)
PLATELET # BLD AUTO: 336 K/UL — SIGNIFICANT CHANGE UP (ref 150–400)
POTASSIUM SERPL-MCNC: 3.4 MMOL/L — LOW (ref 3.5–5.3)
POTASSIUM SERPL-MCNC: 3.5 MMOL/L — SIGNIFICANT CHANGE UP (ref 3.5–5.3)
POTASSIUM SERPL-SCNC: 3.4 MMOL/L — LOW (ref 3.5–5.3)
POTASSIUM SERPL-SCNC: 3.5 MMOL/L — SIGNIFICANT CHANGE UP (ref 3.5–5.3)
RBC # BLD: 3.25 M/UL — LOW (ref 3.8–5.2)
RBC # BLD: 3.27 M/UL — LOW (ref 3.8–5.2)
RBC # FLD: 14.7 % — HIGH (ref 10.3–14.5)
RBC # FLD: 14.9 % — HIGH (ref 10.3–14.5)
SODIUM SERPL-SCNC: 141 MMOL/L — SIGNIFICANT CHANGE UP (ref 135–145)
SODIUM SERPL-SCNC: 142 MMOL/L — SIGNIFICANT CHANGE UP (ref 135–145)
SPECIMEN SOURCE: SIGNIFICANT CHANGE UP
WBC # BLD: 7.52 K/UL — SIGNIFICANT CHANGE UP (ref 3.8–10.5)
WBC # BLD: 7.62 K/UL — SIGNIFICANT CHANGE UP (ref 3.8–10.5)
WBC # FLD AUTO: 7.52 K/UL — SIGNIFICANT CHANGE UP (ref 3.8–10.5)
WBC # FLD AUTO: 7.62 K/UL — SIGNIFICANT CHANGE UP (ref 3.8–10.5)

## 2025-07-11 PROCEDURE — 74176 CT ABD & PELVIS W/O CONTRAST: CPT | Mod: 26

## 2025-07-11 PROCEDURE — 99233 SBSQ HOSP IP/OBS HIGH 50: CPT

## 2025-07-11 RX ORDER — POTASSIUM CHLORIDE, DEXTROSE MONOHYDRATE AND SODIUM CHLORIDE 150; 5; 900 MG/100ML; G/100ML; MG/100ML
1000 INJECTION, SOLUTION INTRAVENOUS
Refills: 0 | Status: DISCONTINUED | OUTPATIENT
Start: 2025-07-11 | End: 2025-07-12

## 2025-07-11 RX ORDER — ONDANSETRON HCL/PF 4 MG/2 ML
4 VIAL (ML) INJECTION ONCE
Refills: 0 | Status: COMPLETED | OUTPATIENT
Start: 2025-07-11 | End: 2025-07-11

## 2025-07-11 RX ADMIN — POTASSIUM CHLORIDE, DEXTROSE MONOHYDRATE AND SODIUM CHLORIDE 75 MILLILITER(S): 150; 5; 900 INJECTION, SOLUTION INTRAVENOUS at 16:52

## 2025-07-11 RX ADMIN — Medication 1 TABLET(S): at 15:06

## 2025-07-11 RX ADMIN — Medication 4 MILLIGRAM(S): at 11:52

## 2025-07-11 RX ADMIN — Medication 20 MILLIEQUIVALENT(S): at 16:52

## 2025-07-11 RX ADMIN — Medication 100 MILLIGRAM(S): at 05:27

## 2025-07-11 RX ADMIN — Medication 4 MILLIGRAM(S): at 11:51

## 2025-07-11 RX ADMIN — Medication 4 MILLIGRAM(S): at 06:53

## 2025-07-11 RX ADMIN — Medication 100 MILLIGRAM(S): at 17:46

## 2025-07-11 NOTE — PROGRESS NOTE ADULT - ATTENDING COMMENTS
Patient seen and examined. Recovering well from surgery.   Dispo planning per medicine/nephrology.   Patient is cleared for discharge from a surgery standpoint.
Patient seen and examined this AM at bedside. Left breast surgical site is recovering well. Swelling and redness are improved. Mild persistent edema to the left lateral chest wall. Drain is serous.    Patient  now with elevated creatinine. Medicine and Nephrology following.   Right renal stone seen on recent CT.     No further plastic surgery intervention needed.   Drain care  Following along.
patient seen and examined at bedside. Agree with above examination and physical findings.   Left breast swelling is improved, erythema is present but faded. Drain serosang.    Prelim cultures growing staph aureus.     Follow up OR cultures, consult ID  f/u AM labs  Pain control  OOB, ambulate  drain care

## 2025-07-11 NOTE — CONSULT NOTE ADULT - SUBJECTIVE AND OBJECTIVE BOX
This is a 49F with left breast cancer who is now s/p bilateral mastectomy and reconstruction with HyPAD ADOLFO flap (ADOLFO flap with alloderm) on 6/17/2025. She initially recovered well from surgery until this past Wednesday 7/2/25 when her left breast drain was removed. She reports that immediately after the drain removal she experienced significant left breast pain and swelling. Over the next 24 hrs she developed left breast redness and warmth. She was seen by the PA in the office yesterday and started on oral antibiotics. There was no improvement in her symptoms after 24 hr of abx and the decision was made to go to the hospital for further evaluation and management.     She denies fevers or chills at home  She has a family history of Factor V Leiden with placental thrombosis and she was prescribed Lovenox for 30 days postoperatively.       Vital Signs Last 24 Hrs  T(C): 36.6 (04 Jul 2025 19:19), Max: 36.6 (04 Jul 2025 19:19)  T(F): 97.9 (04 Jul 2025 19:19), Max: 97.9 (04 Jul 2025 19:19)  HR: 101 (04 Jul 2025 19:19) (101 - 101)  BP: 106/66 (04 Jul 2025 19:19) (106/66 - 106/66)  BP(mean): --  RR: 18 (04 Jul 2025 19:19) (18 - 18)  SpO2: 97% (04 Jul 2025 19:19) (97% - 97%)    Parameters below as of 04 Jul 2025 19:19  Patient On (Oxygen Delivery Method): room air                            7.7    13.47 )-----------( 363      ( 04 Jul 2025 20:15 )             24.2   07-04    139  |  104  |  21  ----------------------------<  117[H]  3.4[L]   |  30  |  0.45[L]    Ca    8.9      04 Jul 2025 20:15    TPro  6.7  /  Alb  2.5[L]  /  TBili  0.3  /  DBili  x   /  AST  40  /  ALT  60[H]  /  AlkPhos  80  07-04      Exam:    Right breast is soft and  warm. Skin paddle is pink with good cap refill. No signs of infection.   Left breast with significant erythema and warmth consistent with cellulitis. The left breast is swollen compared with the right.   The skin paddle is pink with good cap refill. There is a strong audible doppler signal at the skin paddle. No sign of active bleeding or expanding hematoma at this time.   Abdomen incisions are C/D/I. 
49 F hx of nephrolithiasis, L breast cancer s/p bl mastectomy and ADOLFO on 6/17/2025. s/p 2 drains removed from R breast on Monday and 2 drains removed from L breast on Wednesday. Subsequently the L breast started to become painful and swollen after the drains were removed. She had returned on yesterday and re-evaluated and dopplers were reportedly fine and started on Keflex for L breast infection. She continued to have persistent pain and swelling and advised ED visit. Dened any fevers or chills. +sweats.   No cough, SOB, NVD, dysuria, melena.     Pt developed RICARDO during this admission, baseline Cr 0.44, current Cr 1.93, nephrology is following, CT a/p was done, showing right renal lower pole 9mm, non obstructing calculus. Pt denies back pain or issues voiding.  Urology consult requested to assess kidney stone    PAST MEDICAL & SURGICAL HISTORY:  Nephrolithiasis      Breast cancer      S/P bilateral mastectomy      H/O lithotripsy      MEDICATIONS  (STANDING):  doxycycline monohydrate Capsule 100 milliGRAM(s) Oral every 12 hours  lactobacillus acidophilus 1 Tablet(s) Oral daily  sodium chloride 0.45% with potassium chloride 20 mEq/L 1000 milliLiter(s) (75 mL/Hr) IV Continuous <Continuous>    Allergies    No Known Allergies    Intolerances    SHx - NC    FHx - NC    Vital Signs Last 24 Hrs  T(C): 36.9 (11 Jul 2025 14:05), Max: 36.9 (11 Jul 2025 05:46)  T(F): 98.5 (11 Jul 2025 14:05), Max: 98.5 (11 Jul 2025 05:46)  HR: 65 (11 Jul 2025 14:05) (65 - 76)  BP: 99/60 (11 Jul 2025 14:05) (99/60 - 128/68)  RR: 17 (11 Jul 2025 14:05) (14 - 20)  SpO2: 100% (11 Jul 2025 14:05) (95% - 100%)    Parameters below as of 11 Jul 2025 14:05  Patient On (Oxygen Delivery Method): room air    PHYSICAL EXAM:  General: NAD, A/O x 3  ENT: No gross hearing impairment, Moist mucous membranes, no thrush  Neck: Supple, No JVD  Abdomen: Soft, Nontender, Nondistended; Bowel sounds present, no CVA tenderness b/l  Extremities: No calf tenderness, No cyanosis, No pitting edema  Psych: Appropriate mood and affect                          8.4    7.62  )-----------( 289      ( 11 Jul 2025 14:05 )             26.4     07-11    142  |  108  |  19  ----------------------------<  126[H]  3.4[L]   |  30  |  1.73[H]    Ca    9.2      11 Jul 2025 14:05  Mg     1.9     07-11    Urinalysis (07.08.25 @ 08:23)    Blood, Urine: Negative   Glucose Qualitative, Urine: Negative mg/dL   pH Urine: 6.5   Color: Yellow   Urine Appearance: Clear   Bilirubin: Negative   Ketone , Urine: Negative mg/dL   Specific Gravity: 1.005   Protein, Urine: Negative mg/dL   Urobilinogen: 0.2 mg/dL   Nitrite: Negative   Leukocyte Esterase Concentration: Moderate    < from: CT Abdomen and Pelvis No Cont (07.11.25 @ 10:35) >  KIDNEYS/URETERS: Nonobstructing right lower pole renal stone measures 9   mm.    BLADDER: Within normal limits.    < end of copied text >        
HPI:   Patient is a 49y female with breast cancer, underwent bilateral mastectomy and obinna flap about 3 weeks ago. Left breast drain was removed about 5 d ago and right after breast got red and swollen adn painful she took keflex but no better so came in 3 d ago. She had aspiration of the the left breast and grew staph aureus. She began to drain pus from the old drain hole. She underwent washout of breast yesterday and cultures pend.     REVIEW OF SYSTEMS:  All other review of systems negative (Comprehensive ROS)    PAST MEDICAL & SURGICAL HISTORY:  Nephrolithiasis      Breast cancer      S/P bilateral mastectomy      H/O lithotripsy          Allergies    No Known Allergies    Intolerances        Antimicrobials Day #  :pod 1  vancomycin  IVPB 1250 milliGRAM(s) IV Intermittent every 8 hours    Other Medications:  acetaminophen     Tablet .. 650 milliGRAM(s) Oral every 6 hours PRN  traMADol 50 milliGRAM(s) Oral every 6 hours PRN      FAMILY HISTORY:  FH: breast cancer        SOCIAL HISTORY:  Smoking: [ ]Yes [ x]No  ETOH: [ ]Yes [x ]No  Drug Use: [ ]Yes [ ]No   [ x] Single[ ]    T(F): 98.2 (07-07-25 @ 12:27), Max: 98.2 (07-06-25 @ 17:38)  HR: 74 (07-07-25 @ 12:27)  BP: 93/58 (07-07-25 @ 12:27)  RR: 18 (07-07-25 @ 12:27)  SpO2: 99% (07-07-25 @ 12:27)  Wt(kg): --    PHYSICAL EXAM:  General: alert, no acute distress  Eyes:  anicteric, no conjunctival injection, no discharge  Oropharynx: no lesions or injection 	  Neck: supple, without adenopathy  Lungs: clear to auscultation  Heart: regular rate and rhythm; no murmur, rubs or gallops  Abdomen: soft, nondistended, nontender, without mass or organomegaly. wound healed  Skin: no lesions  Extremities: no clubbing, cyanosis, or edema  Neurologic: alert, oriented, moves all extremities  left breast with drain on side, very faint red and minimal induration, superficial skin necrosis at aureola area   LAB RESULTS:                        7.9    7.69  )-----------( 329      ( 07 Jul 2025 06:23 )             24.6     07-07    141  |  104  |  7   ----------------------------<  101[H]  3.6   |  28  |  0.43[L]    Ca    9.5      07 Jul 2025 06:23  Mg     2.0     07-07        Urinalysis Basic - ( 07 Jul 2025 06:23 )    Color: x / Appearance: x / SG: x / pH: x  Gluc: 101 mg/dL / Ketone: x  / Bili: x / Urobili: x   Blood: x / Protein: x / Nitrite: x   Leuk Esterase: x / RBC: x / WBC x   Sq Epi: x / Non Sq Epi: x / Bacteria: x        MICROBIOLOGY:  RECENT CULTURES:  07-06 @ 13:40 Surgical Swab Left Brest C&S #1 aerobic/anaerobic       Rare polymorphonuclear leukocytes seen per low power field  No organisms seen per oil power field    07-05 @ 10:20 Aspirate Aspirate     Numerous Staphylococcus aureus    Numerous polymorphonuclear leukocytes per low power field  Moderate Gram Positive Cocci in Clusters per oil power field    07-04 @ 21:10 Clean Catch None     No growth      07-04 @ 20:18 Blood Blood-Peripheral     No growth at 48 Hours      07-04 @ 20:15 Blood Blood-Peripheral     No growth at 48 Hours            RADIOLOGY REVIEWED:  < from: CT Abdomen and Pelvis w/ IV Cont (07.04.25 @ 21:16) >    ACC: [id number] EXAM:  CT ABDOMEN AND PELVIS IC   ORDERED BY: MICHELE JAY     ACC: [id number] EXAM:  CT CHEST IC   ORDERED BY: MICHELE JAY     PROCEDURE DATE:  07/04/2025          INTERPRETATION:  CLINICAL INFORMATION: Status post hysterectomy with flap   on 6/17/2025, rule out abscess    COMPARISON: None.    CONTRAST/COMPLICATIONS:  IV Contrast: Omnipaque 350  90 cc administered   10 cc discarded  Oral Contrast: NONE.    PROCEDURE:  CT of the Chest, Abdomen and Pelvis was performed.  Sagittal and coronal reformats were performed.    FINDINGS:  CHEST:  LUNGS AND LARGE AIRWAYS: Patent central airways. No consolidations.  PLEURA: No pleural effusion.  VESSELS: Within normal limits.  HEART: Heart size is normal. No pericardial effusion.  MEDIASTINUM AND ANDIE: No lymphadenopathy.  CHEST WALL AND LOWER NECK: Subcentimeter right thyroid lobe nodule.   Sequelae of bilateral mastectomies with flap reconstruction. At the   lateral aspect of the left breast there is a 3.3 x 2.5 x 4.9 cm   collection with surrounding fat stranding. At the medial aspect of the   left breast there is a small amount of subcutaneous fluid without   definite organization.    ABDOMEN AND PELVIS:  LIVER: Within normal limits.  BILE DUCTS: Normal caliber.  GALLBLADDER: Collapsed gallbladder with cholelithiasis.  SPLEEN: Within normal limits.  PANCREAS: Within normal limits.  ADRENALS: Within normal limits.  KIDNEYS/URETERS: No hydronephrosis. Bilateral too small to characterize   hypodense foci. Right renal cyst. 1 cm right lower pole nonobstructing   renal calculus.    BLADDER: Within normal limits.  REPRODUCTIVE ORGANS: Uterus and adnexa within normal limits.    BOWEL: No bowel obstruction. Appendix is normal.  PERITONEUM/RETROPERITONEUM: Within normal limits.  VESSELS: Within normal limits.  LYMPH NODES: No lymphadenopathy.  ABDOMINAL WALL: Postsurgical changes. No organized abdominal wall fluid   collections. Possible small postoperative left rectus sheath hematoma..  BONES: No acute osseous abnormality.    IMPRESSION:  Sequelae of bilateral mastectomies with flap reconstruction.    At the lateral aspect of the left breast there is a 3.3 x 2.5 x 4.9 cm   collection with surrounding fat stranding, likely postoperative   hematoma/seroma, superimposed infection is not definitively excluded.    At the medial aspect of the left breast there is a small amount of   subcutaneous fluid without definite organization.          < end of copied text >          Impression:49y female with breast cancer, underwent bilateral mastectomy and obinna flap about 3 weeks ago. Left breast drain was removed about 5 d ago and right after breast got red and swollen adn painful she took keflex but no better so came in 3 d ago. She had aspiration of the the left breast and grew staph aureus. She began to drain pus from the old drain hole. She underwent wash out yesterday.     Patient with cellulitis and infected collection in obinna flap left breast after drain pulled. She now underwent washout and is growing staph aureus from the  breast aspirate. She is already doing better since washout as critical intervention      Recommendations:  continue vancomycin. We do not need superhigh trough so bring down to q 12h  f/u staph sensitivity and operative cx  hope for po abx in next day or 2  r/w Dr. Rivera
NEPHROLOGY CONSULTATION    CHIEF COMPLAINT: L breast infection     HPI:  Pt is 50 yo F hx of nephrolithiasis, L breast cancer s/p b/l mastectomy and ADOLFO on 6/17/2025. S/p 2 drains, eventually removed, L breast started to become painful and swollen after the drain was removed. Adm 7/4 for persistent pain and swelling of L breast. No CP, SOB, N/V/D/C/F/C. S/p CT w/IV dye 7/4. S/p L breast wound was out in OR 7/6. High Cr noted today.    ROS:  as above    Allergies:  No Known Allergies    PAST MEDICAL & SURGICAL HISTORY:  Nephrolithiasis  Breast cancer  S/P bilateral mastectomy  H/O lithotripsy    SOCIAL HISTORY:  negative    FAMILY HISTORY:  breast cancer    MEDICATIONS  (STANDING):  cephalexin 500 milliGRAM(s) Oral four times a day  lactobacillus acidophilus 1 Tablet(s) Oral daily  sodium chloride 0.9%. 1000 milliLiter(s) (83 mL/Hr) IV Continuous <Continuous>    Vital Signs Last 24 Hrs  T(C): 37.9 (07-08-25 @ 21:44), Max: 37.9 (07-08-25 @ 21:44)  T(F): 100.2 (07-08-25 @ 21:44), Max: 100.2 (07-08-25 @ 21:44)  HR: 88 (07-08-25 @ 21:44) (68 - 88)  BP: 90/55 (07-08-25 @ 21:44) (90/55 - 101/64)  RR: 15 (07-08-25 @ 21:44) (15 - 17)  SpO2: 93% (07-08-25 @ 21:44) (93% - 98%)    I&O's Detail    07 Jul 2025 07:01  -  08 Jul 2025 07:00  --------------------------------------------------------  IN:    Oral Fluid: 600 mL  Total IN: 600 mL    OUT:    Bulb (mL): 37.5 mL  Total OUT: 37.5 mL    08 Jul 2025 07:01  -  08 Jul 2025 23:52  --------------------------------------------------------  IN:    sodium chloride 0.9%: 664 mL  Total IN: 664 mL    OUT:    Bulb (mL): 10 mL  Total OUT: 10 mL    s1s2  b/l air entry  soft, ND  no edema    LABS:                        8.4    8.55  )-----------( 352      ( 08 Jul 2025 06:05 )             26.7     07-08    138  |  103  |  29[H]  ----------------------------<  105[H]  3.8   |  29  |  1.85[H]    Ca    9.2      08 Jul 2025 12:20  Mg     2.1     07-08    Culture - Wound Aerobic/Anaerobic (collected 06 Jul 2025 13:40)  Source: Surgical Swab Left Brest C&amp;S #2 aerobic/anaerobic  Gram Stain (07 Jul 2025 19:42):    Rare polymorphonuclear leukocytes seen per low power field    No organisms seen per oil power field  Preliminary Report (07 Jul 2025 19:42):    Few Staphylococcus aureus  Organism: Staphylococcus aureus (08 Jul 2025 19:54)  Organism: Staphylococcus aureus (08 Jul 2025 19:54)    Culture - Wound Aerobic/Anaerobic (collected 06 Jul 2025 13:40)  Source: Surgical Swab Left Brest C&amp;S #1 aerobic/anaerobic  Gram Stain (07 Jul 2025 19:43):    Rare polymorphonuclear leukocytes seen per low power field    No organisms seen per oil power field  Preliminary Report (07 Jul 2025 19:43):    Few Staphylococcus aureus  Organism: Staphylococcus aureus (08 Jul 2025 19:53)  Organism: Staphylococcus aureus (08 Jul 2025 19:53)    A/P:    S/p b/l mastectomy and ADOLFO on 6/17  L breast cellulitis s/p wash out in OR 7/6  Etiology of RICARDO not obvious  Possibly septic/hemodynamic and/or Vanco related  Agree w/IV hydration  F/u UA, BMP, UO  Avoid nephrotoxins    [phone]

## 2025-07-11 NOTE — PROGRESS NOTE ADULT - ASSESSMENT
50 y/o female w/ PMH of nephrolithiasis, left breast cancer s/p B/L mastectomy and ADOLFO on 6/17/2025 with left breast cellulitis and collection likely hematoma/seroma per CT and acute anemia.    #Sepsis (present on admission; , WBC 13.4) due to left breast abscess and cellulitis  #POD #5 from washout  - Pt. is > 2.5 weeks s/p bilateral mastectomy and ADOLFO flap  - Appreciate surgery and ID consult teams  - CT C/A/P: lateral left breast - 3.3 x 2.5 x 4.9 cm collection with surrounding fat stranding, likely postop hematoma/seroma, superimposed infection is not definitively excluded. At the medial aspect of the left breast there is a small amount of subcutaneous fluid without definite organization  - 7/6 s/p of purulent fluid was aspirated from the lateral chest pocket by surgical PA > cultures sent > growing gram staph aureus > sensitivities reviewed  - 7/4 blood cultures no growth  - Stopped vancomycin due to RICARDO  - Due to concerns with elevated Cr, switched from po keflex to po doxycycline, discussed with surgery and ID  - c/w tylenol as needed for pain    #ATN  - Noted creatinine 1.93 this am   - Dr Devine following   - Stopped vancomycin, continue IVF  - Switched from PO keflex to po doxycycline   - Ordered CTAP noncontrast  - Monitor BMP  - Avoid nephrotoxic agents    # Left breast seroma/hematoma  # Anemia  - Hgb stable, s/p 1U PRBC   - Keep active T+S  - Trend CBC    #Hypokalemia-resolved  - Will monitor    # VTE ppx: SCDs for now    # GOC: Full code     Spoke to patient PMD Dr. Cheng as per her request. Patient updated on plan of care

## 2025-07-11 NOTE — PROGRESS NOTE ADULT - ASSESSMENT
Patient with left breast infection after HyPAD ADOLFO flap.       - sp washout, pt currently AVSS. Exam much improved. Erythema resolved   - f/u AM labs, trending Cr  - Cx w/ resulted sensitivities. plan for course of doxy  - dc per med/nephro    Robby Smyth  Plastic & Reconstructive Surgery, PGY-3

## 2025-07-11 NOTE — CONSULT NOTE ADULT - ASSESSMENT
50 yo female admitted for left breast cellulitis    pt currently with RICARDO, nephrology following    pt was found to have 9mm, right lower pole non obstructing calculus    RICARDO is not post renal in etiology as calculus is non obstructing and pt voiding w/o issue    case and images d/w Dr. Lubin, no acute  intervention warranted at this time    op  f/u     
Paula is 2.5 wks s/p bilateral mastectomy and HyPAD ADOLFO flap reconstruction. She presents with acute onset left breast swelling and erythema starting after drain removal more than 48 hours ago. Questionable self-limited hematoma at time of drain removal given low H/H. However, there is no sign of continued bleeding and no expanding hematoma on serial examination tonight. The ADOLFO flap skin paddle is pink with good capillary refill and there is a strong audible arterial doppler signal.     Patient to be admitted for broad spectrum IV antibiotics for acute left breast cellulitis  No emergent surgical intervention planned at this time  Await CT results  Trend H/H  Serial examinations  Will continue to re-evaluate. If there is any sign of flap compromise or if erythema does not improve, will require RTOR for exploration / washout.

## 2025-07-11 NOTE — PROGRESS NOTE ADULT - SUBJECTIVE AND OBJECTIVE BOX
Plastic Surgery Progress Note (pg [facility]: [id number], [facility]: [phone])    SUBJECTIVE  The patient was seen and examined.     OBJECTIVE  ___________________________________________________  VITAL SIGNS / I&O's   Vital Signs Last 24 Hrs  T(C): 36.9 (11 Jul 2025 05:46), Max: 36.9 (11 Jul 2025 05:46)  T(F): 98.5 (11 Jul 2025 05:46), Max: 98.5 (11 Jul 2025 05:46)  HR: 75 (11 Jul 2025 05:46) (66 - 85)  BP: 101/63 (11 Jul 2025 05:46) (99/63 - 115/73)  BP(mean): --  RR: 16 (11 Jul 2025 05:46) (16 - 18)  SpO2: 97% (11 Jul 2025 05:46) (95% - 98%)    Parameters below as of 11 Jul 2025 05:46  Patient On (Oxygen Delivery Method): room air          09 Jul 2025 07:01  -  10 Jul 2025 07:00  --------------------------------------------------------  IN:    sodium chloride 0.9%: 415 mL    sodium chloride 0.9%: 1500 mL  Total IN: 1915 mL    OUT:    Bulb (mL): 14 mL  Total OUT: 14 mL    Total NET: 1901 mL      10 Jul 2025 07:01  -  11 Jul 2025 06:55  --------------------------------------------------------  IN:    sodium chloride 0.9%: 1300 mL  Total IN: 1300 mL    OUT:    Bulb (mL): 9 mL  Total OUT: 9 mL    Total NET: 1291 mL        ___________________________________________________  PHYSICAL EXAM    Right breast exam unchanged  left breast with significant improvement in swelling and erythema. still moderate warmth laterally.   drain serosang.   both ADOLFO flaps viable    ___________________________________________________  LABS                        7.5    7.90  )-----------( 262      ( 10 Jul 2025 06:10 )             24.0     10 Jul 2025 18:45    140    |  105    |  20     ----------------------------<  123    3.6     |  28     |  1.89     Ca    9.1        10 Jul 2025 18:45  Mg     2.1       09 Jul 2025 07:10        CAPILLARY BLOOD GLUCOSE            Urinalysis Basic - ( 10 Jul 2025 18:45 )    Color: x / Appearance: x / SG: x / pH: x  Gluc: 123 mg/dL / Ketone: x  / Bili: x / Urobili: x   Blood: x / Protein: x / Nitrite: x   Leuk Esterase: x / RBC: x / WBC x   Sq Epi: x / Non Sq Epi: x / Bacteria: x      ___________________________________________________  MICRO  Recent Cultures:  Specimen Source: Surgical Swab Left Brest C&S #1 aerobic/anaerobic, 07-06 @ 13:40; Results   Few Staphylococcus aureus[!]; Gram Stain:   Rare polymorphonuclear leukocytes seen per low power field  No organisms seen per oil power field[!]; Organism: Staphylococcus aureus  Specimen Source: Aspirate Aspirate, 07-05 @ 10:20; Results   Numerous Staphylococcus aureus[!]; Gram Stain:   Numerous polymorphonuclear leukocytes per low power field  Moderate Gram Positive Cocci in Clusters per oil power field[!]; Organism: Staphylococcus aureus  Specimen Source: Clean Catch None, 07-04 @ 21:10; Results   No growth; Gram Stain: --; Organism: --  Specimen Source: Blood Blood-Peripheral, 07-04 @ 20:18; Results   No growth at 5 days; Gram Stain: --; Organism: --  Specimen Source: Blood Blood-Peripheral, 07-04 @ 20:15; Results   No growth at 5 days; Gram Stain: --; Organism: --    ___________________________________________________  MEDICATIONS  (STANDING):  doxycycline monohydrate Capsule 100 milliGRAM(s) Oral every 12 hours  lactobacillus acidophilus 1 Tablet(s) Oral daily  sodium chloride 0.9%. 1000 milliLiter(s) (100 mL/Hr) IV Continuous <Continuous>    MEDICATIONS  (PRN):  acetaminophen     Tablet .. 650 milliGRAM(s) Oral every 6 hours PRN Mild Pain (1 - 3)  ondansetron Injectable 4 milliGRAM(s) IV Push three times a day PRN Nausea and/or Vomiting  traMADol 50 milliGRAM(s) Oral every 6 hours PRN Moderate Pain (4 - 6)

## 2025-07-11 NOTE — PROVIDER CONTACT NOTE (OTHER) - SITUATION
Patient requested immediate attention from the doctor. She had incisional abdominal pain, left flank pain and nausea/vomitting. Zach OAKLEY was notified, she came to assess the patient at bedside.

## 2025-07-11 NOTE — PROGRESS NOTE ADULT - SUBJECTIVE AND OBJECTIVE BOX
CC: f/u for    Patient reports she had some vomiting and now her abdomen hurts    REVIEW OF SYSTEMS:  All other review of systems negative (Comprehensive ROS)    Antimicrobials Day #  :pod 5/10  doxycycline monohydrate Capsule 100 milliGRAM(s) Oral every 12 hours    Other Medications Reviewed    T(F): 98.5 (07-11-25 @ 14:05), Max: 98.5 (07-11-25 @ 05:46)  HR: 65 (07-11-25 @ 14:05)  BP: 99/60 (07-11-25 @ 14:05)  RR: 17 (07-11-25 @ 14:05)  SpO2: 100% (07-11-25 @ 14:05)  Wt(kg): --    PHYSICAL EXAM:  General: alert, no acute distress  Eyes:  anicteric, no conjunctival injection, no discharge  Oropharynx: no lesions or injection 	  Neck: supple, without adenopathy  Lungs: clear to auscultation  Heart: regular rate and rhythm; no murmur, rubs or gallops  Abdomen: soft, nondistended, nontender, without mass or organomegaly  Skin: no lesions  Extremities: no clubbing, cyanosis, or edema  Neurologic: alert, oriented, moves all extremities  left breast not red, drain in placed, scabbed skin at aureola  LAB RESULTS:                        8.4    7.62  )-----------( 289      ( 11 Jul 2025 14:05 )             26.4     07-11    142  |  108  |  19  ----------------------------<  126[H]  3.4[L]   |  30  |  1.73[H]    Ca    9.2      11 Jul 2025 14:05  Mg     1.9     07-11        Urinalysis Basic - ( 11 Jul 2025 14:05 )    Color: x / Appearance: x / SG: x / pH: x  Gluc: 126 mg/dL / Ketone: x  / Bili: x / Urobili: x   Blood: x / Protein: x / Nitrite: x   Leuk Esterase: x / RBC: x / WBC x   Sq Epi: x / Non Sq Epi: x / Bacteria: x      MICROBIOLOGY:  RECENT CULTURES:      RADIOLOGY REVIEWED:  < from: CT Abdomen and Pelvis No Cont (07.11.25 @ 10:35) >    ACC: [id number] EXAM:  CT ABDOMEN AND PELVIS   ORDERED BY:  VICKY DARLING     PROCEDURE DATE:  07/11/2025          INTERPRETATION:  CLINICAL INFORMATION: Abdominal pain. Cellulitis,   unspecified.    COMPARISON: 7/4/2025    CONTRAST/COMPLICATIONS:  IV Contrast: NONE  Oral Contrast: NONE.    PROCEDURE:  CT of the Abdomen and Pelvis was performed.  Sagittal and coronal reformats were performed.    FINDINGS:  LOWER CHEST: Small bilateral pleural effusions with adjacent passive   atelectasis. Postoperative changes status post bilateral breast   reconstruction. Surgical drainage catheter in the left breast. Small   residual collection of fluid and gas adjacent to the inferior lateral   course of the drainage catheter measuring 1.7 cm approximately3 cm deep   to the catheter insertion site (601:67). Subcutaneous air in the left   breast present on prior examination is no longer seen.    LIVER: Within normal limits.  BILE DUCTS: Normal caliber.  GALLBLADDER: Cholelithiasis.  SPLEEN: Within normal limits.  PANCREAS: Within normal limits.  ADRENALS: Within normal limits.  KIDNEYS/URETERS: Nonobstructing right lower pole renal stone measures 9   mm.    BLADDER: Within normal limits.  REPRODUCTIVE ORGANS: Uterus and bilateral adnexa.    BOWEL: No bowel obstruction. Appendix is normal.  PERITONEUM/RETROPERITONEUM: Trace free fluid in the pelvis, likely   physiologic.  VESSELS: Within normal limits.  LYMPH NODES: No lymphadenopathy.  ABDOMINAL WALL: Within normal limits.  BONES: Within normallimits.    IMPRESSION:  *  Postoperative changes status post bilateral breast reconstruction with   new surgical drain in the left breast.  *  Small residual collection of fluid and gas along the inferior lateral   course of the surgical drain, approximately 3 cm from its insertion site.  *  Small bilateral pleural effusions and adjacent passive atelectasis.  *  No evidence of acute abnormality in the abdomen and pelvis.          --- End of Report ---            NESS CREWS MD; Attending Radiologist  This document has been electronically signed. Jul 11 2025  1:36PM    < end of copied text >          < from: CT Abdomen and Pelvis w/ IV Cont (07.04.25 @ 21:16) >    ACC: [id number] EXAM:  CT ABDOMEN AND PELVIS IC   ORDERED BY: MICHELE JAY     ACC: [id number] EXAM:  CT CHEST IC   ORDERED BY: MICHELE JAY     PROCEDURE DATE:  07/04/2025          INTERPRETATION:  CLINICAL INFORMATION: Status post hysterectomy with flap   on 6/17/2025, rule out abscess    COMPARISON: None.    CONTRAST/COMPLICATIONS:  IV Contrast: Omnipaque 350  90 cc administered   10 cc discarded  Oral Contrast: NONE.    PROCEDURE:  CT of the Chest, Abdomen and Pelvis was performed.  Sagittal and coronal reformats were performed.    FINDINGS:  CHEST:  LUNGS AND LARGE AIRWAYS: Patent central airways. No consolidations.  PLEURA: No pleural effusion.  VESSELS: Within normal limits.  HEART: Heart size is normal. No pericardial effusion.  MEDIASTINUM AND ANDIE: No lymphadenopathy.  CHEST WALL AND LOWER NECK: Subcentimeter right thyroid lobe nodule.   Sequelae of bilateral mastectomies with flap reconstruction. At the   lateral aspect of the left breast there is a 3.3 x 2.5 x 4.9 cm   collection with surrounding fat stranding. At the medial aspect of the   left breast there is a small amount of subcutaneous fluid without   definite organization.    ABDOMEN AND PELVIS:  LIVER: Within normal limits.  BILE DUCTS: Normal caliber.  GALLBLADDER: Collapsed gallbladder with cholelithiasis.  SPLEEN: Within normal limits.  PANCREAS: Within normal limits.  ADRENALS: Within normal limits.  KIDNEYS/URETERS: No hydronephrosis. Bilateral too small to characterize   hypodense foci. Right renal cyst. 1 cm right lower pole nonobstructing   renal calculus.    BLADDER: Within normal limits.  REPRODUCTIVE ORGANS: Uterus and adnexa within normal limits.    BOWEL: No bowel obstruction. Appendix is normal.  PERITONEUM/RETROPERITONEUM: Within normal limits.  VESSELS: Within normal limits.  LYMPH NODES: No lymphadenopathy.  ABDOMINAL WALL: Postsurgical changes. No organized abdominal wall fluid   collections. Possible small postoperative left rectus sheath hematoma..  BONES: No acute osseous abnormality.    IMPRESSION:  Sequelae of bilateral mastectomies with flap reconstruction.    At the lateral aspect of the left breast there is a 3.3 x 2.5 x 4.9 cm   collection with surrounding fat stranding, likely postoperative   hematoma/seroma, superimposed infection is not definitively excluded.    At the medial aspect of the left breast there is a small amount of   subcutaneous fluid without definite organization.        --- End of Report ---        < end of copied text >      Assessment:  patient s/p recent obinna flaps and mastectomy, returned with very swollen and tender left breast, found to have collection and cellulitis. She did not improve on antibiotics so underwent washout about 5 d ago. She has alexis maybe from vanco, maybe contrast, maybe atn. Grew mssa from breast, concern raised for possible ain from beta lactam so now on doxycyline. Some nausea, hopefully will tolerate doxy but could do clindamycin instead if she does not  Plan:  hope for 5 more days of doxycycline  if not tolerating doxy can change to clindamycin

## 2025-07-11 NOTE — CONSULT NOTE ADULT - CONSULT REASON
Left breast cellulitis after ADOLFO flap breast reconstuction
R non obstructing lower pole calculus
RICARDO
left breast cellulitis

## 2025-07-11 NOTE — PROGRESS NOTE ADULT - SUBJECTIVE AND OBJECTIVE BOX
Patient is a 49y old  Female who presents with a chief complaint of L breast cellulitis (11 Jul 2025 06:55)      Patient seen and examined at bedside. No overnight events reported. Patient denies fever, chills, sob, flank pain, back pain, hematuria.     ALLERGIES:  No Known Allergies    MEDICATIONS  (STANDING):  doxycycline monohydrate Capsule 100 milliGRAM(s) Oral every 12 hours  lactobacillus acidophilus 1 Tablet(s) Oral daily  sodium chloride 0.9%. 1000 milliLiter(s) (100 mL/Hr) IV Continuous <Continuous>    MEDICATIONS  (PRN):  acetaminophen     Tablet .. 650 milliGRAM(s) Oral every 6 hours PRN Mild Pain (1 - 3)  ondansetron Injectable 4 milliGRAM(s) IV Push three times a day PRN Nausea and/or Vomiting  traMADol 50 milliGRAM(s) Oral every 6 hours PRN Moderate Pain (4 - 6)    Vital Signs Last 24 Hrs  T(F): 98.5 (11 Jul 2025 05:46), Max: 98.5 (11 Jul 2025 05:46)  HR: 75 (11 Jul 2025 05:46) (66 - 85)  BP: 101/63 (11 Jul 2025 05:46) (99/63 - 110/66)  RR: 16 (11 Jul 2025 05:46) (16 - 18)  SpO2: 97% (11 Jul 2025 05:46) (95% - 98%)  I&O's Summary    10 Jul 2025 07:01  -  11 Jul 2025 07:00  --------------------------------------------------------  IN: 1300 mL / OUT: 9 mL / NET: 1291 mL      PHYSICAL EXAM:  General: NAD, A/O x 3  ENT: No gross hearing impairment, Moist mucous membranes, no thrush  Neck: Supple, No JVD  Lungs: Clear to auscultation bilaterally, good air entry, non-labored breathing  Cardio: RRR, S1/S2, No murmur  Abdomen: Soft, Nontender, Nondistended; Bowel sounds present, no CVA tenderness  Extremities: No calf tenderness, No cyanosis, No pitting edema  Psych: Appropriate mood and affect    LABS:                        8.3    7.52  )-----------( 336      ( 11 Jul 2025 07:12 )             26.6     07-11    141  |  106  |  20  ----------------------------<  100  3.5   |  27  |  1.93    Ca    9.2      11 Jul 2025 07:12  Mg     1.9     07-11                                        Urinalysis Basic - ( 11 Jul 2025 07:12 )    Color: x / Appearance: x / SG: x / pH: x  Gluc: 100 mg/dL / Ketone: x  / Bili: x / Urobili: x   Blood: x / Protein: x / Nitrite: x   Leuk Esterase: x / RBC: x / WBC x   Sq Epi: x / Non Sq Epi: x / Bacteria: x        Culture - Abscess with Gram Stain (collected 05 Jul 2025 10:20)  Source: Abscess left breast  Gram Stain (06 Jul 2025 00:45):    Moderate polymorphonuclear leukocytes seen per low power field    Few Gram Positive Cocci in Clusters seen per oil power field  Final Report (10 Jul 2025 18:28):    Numerous Staphylococcus aureus  Organism: Staphylococcus aureus (10 Jul 2025 18:28)  Organism: Staphylococcus aureus (10 Jul 2025 18:28)      -  Clindamycin: S <=0.25      -  Oxacillin: S 0.5 Oxacillin predicts susceptibility for dicloxacillin, methicillin, and nafcillin      -  Gentamicin: S <=4 Should not be used as monotherapy      -  Vancomycin: S 1      -  Tetracycline: S <=4      Method Type: MICKY      -  Penicillin: R >2      -  Rifampin: S <=1 Should not be used as monotherapy      -  Erythromycin: R >4      -  Trimethoprim/Sulfamethoxazole: S <=0.5/9.5    Culture - Urine (collected 04 Jul 2025 21:10)  Source: Clean Catch None  Final Report (06 Jul 2025 12:36):    No growth    Culture - Blood (collected 04 Jul 2025 20:18)  Source: Blood Blood-Peripheral  Final Report (10 Jul 2025 02:01):    No growth at 5 days    Culture - Blood (collected 04 Jul 2025 20:15)  Source: Blood Blood-Peripheral  Final Report (10 Jul 2025 02:00):    No growth at 5 days        RADIOLOGY & ADDITIONAL TESTS:    Care Discussed with Consultants/Other Providers:

## 2025-07-11 NOTE — PROGRESS NOTE ADULT - SUBJECTIVE AND OBJECTIVE BOX
No CP, SOB, s/p abd pain, s/p CT A/P    Vital Signs Last 24 Hrs  T(C): 37 (07-11-25 @ 18:00), Max: 37 (07-11-25 @ 18:00)  T(F): 98.6 (07-11-25 @ 18:00), Max: 98.6 (07-11-25 @ 18:00)  HR: 65 (07-11-25 @ 18:00) (65 - 76)  BP: 118/76 (07-11-25 @ 18:00) (99/60 - 128/68)  RR: 17 (07-11-25 @ 18:00) (14 - 20)  SpO2: 98% (07-11-25 @ 18:00) (95% - 100%)    I&O's Detail    10 Jul 2025 07:01  -  11 Jul 2025 07:00  --------------------------------------------------------  OUT:    Bulb (mL): 9 mL    s1s2  b/l air entry  soft, ND  no edema                                        8.4    7.62  )-----------( 289      ( 11 Jul 2025 14:05 )             26.4     11 Jul 2025 14:05    142    |  108    |  19     ----------------------------<  126    3.4     |  30     |  1.73     Ca    9.2        11 Jul 2025 14:05  Mg     1.9       11 Jul 2025 07:12    acetaminophen     Tablet .. 650 milliGRAM(s) Oral every 6 hours PRN  doxycycline monohydrate Capsule 100 milliGRAM(s) Oral every 12 hours  lactobacillus acidophilus 1 Tablet(s) Oral daily  morphine  - Injectable 2 milliGRAM(s) IV Push every 4 hours PRN  ondansetron Injectable 4 milliGRAM(s) IV Push three times a day PRN  sodium chloride 0.45% with potassium chloride 20 mEq/L 1000 milliLiter(s) IV Continuous <Continuous>  traMADol 50 milliGRAM(s) Oral every 6 hours PRN    A/P:    S/p b/l mastectomy and ADOLFO on 6/17  L breast cellulitis s/p wash out in OR 7/6  RICARDO possibly septic/hemodynamic and/or Vanco related  ID input appr, Abx per ID   UA bland  Cr is fluctuating but hopefully at plateau and will improve soon  Continue gentle IV hydration w/KCL for another day   F/u CBC, BMP, Mg in am  Avoid nephrotoxins, no NSAID's, d/w pt   S/p abd pain, s/p CT A/P, results noted, small r renal stone, no hydro   input appr   No objection to d/c in am from renal pov, w/close op f/u, as long as Cr is improving  D/w medicine     [phone]

## 2025-07-12 VITALS
DIASTOLIC BLOOD PRESSURE: 73 MMHG | OXYGEN SATURATION: 98 % | HEART RATE: 58 BPM | RESPIRATION RATE: 18 BRPM | TEMPERATURE: 98 F | SYSTOLIC BLOOD PRESSURE: 115 MMHG

## 2025-07-12 LAB
ANION GAP SERPL CALC-SCNC: 7 MMOL/L — SIGNIFICANT CHANGE UP (ref 5–17)
BASOPHILS # BLD AUTO: 0.04 K/UL — SIGNIFICANT CHANGE UP (ref 0–0.2)
BASOPHILS NFR BLD AUTO: 0.8 % — SIGNIFICANT CHANGE UP (ref 0–2)
BUN SERPL-MCNC: 13 MG/DL — SIGNIFICANT CHANGE UP (ref 7–23)
CALCIUM SERPL-MCNC: 9.1 MG/DL — SIGNIFICANT CHANGE UP (ref 8.4–10.5)
CHLORIDE SERPL-SCNC: 106 MMOL/L — SIGNIFICANT CHANGE UP (ref 96–108)
CO2 SERPL-SCNC: 28 MMOL/L — SIGNIFICANT CHANGE UP (ref 22–31)
CREAT SERPL-MCNC: 1.43 MG/DL — HIGH (ref 0.5–1.3)
EGFR: 45 ML/MIN/1.73M2 — LOW
EGFR: 45 ML/MIN/1.73M2 — LOW
EOSINOPHIL # BLD AUTO: 0.14 K/UL — SIGNIFICANT CHANGE UP (ref 0–0.5)
EOSINOPHIL NFR BLD AUTO: 2.8 % — SIGNIFICANT CHANGE UP (ref 0–6)
GLUCOSE SERPL-MCNC: 116 MG/DL — HIGH (ref 70–99)
HCT VFR BLD CALC: 24.8 % — LOW (ref 34.5–45)
HGB BLD-MCNC: 7.8 G/DL — LOW (ref 11.5–15.5)
IMM GRANULOCYTES NFR BLD AUTO: 0.6 % — SIGNIFICANT CHANGE UP (ref 0–0.9)
LYMPHOCYTES # BLD AUTO: 1.39 K/UL — SIGNIFICANT CHANGE UP (ref 1–3.3)
LYMPHOCYTES # BLD AUTO: 28.1 % — SIGNIFICANT CHANGE UP (ref 13–44)
MCHC RBC-ENTMCNC: 25.8 PG — LOW (ref 27–34)
MCHC RBC-ENTMCNC: 31.5 G/DL — LOW (ref 32–36)
MCV RBC AUTO: 82.1 FL — SIGNIFICANT CHANGE UP (ref 80–100)
MONOCYTES # BLD AUTO: 0.43 K/UL — SIGNIFICANT CHANGE UP (ref 0–0.9)
MONOCYTES NFR BLD AUTO: 8.7 % — SIGNIFICANT CHANGE UP (ref 2–14)
NEUTROPHILS # BLD AUTO: 2.92 K/UL — SIGNIFICANT CHANGE UP (ref 1.8–7.4)
NEUTROPHILS NFR BLD AUTO: 59 % — SIGNIFICANT CHANGE UP (ref 43–77)
NRBC BLD AUTO-RTO: 0 /100 WBCS — SIGNIFICANT CHANGE UP (ref 0–0)
PLATELET # BLD AUTO: 287 K/UL — SIGNIFICANT CHANGE UP (ref 150–400)
POTASSIUM SERPL-MCNC: 3.3 MMOL/L — LOW (ref 3.5–5.3)
POTASSIUM SERPL-SCNC: 3.3 MMOL/L — LOW (ref 3.5–5.3)
RBC # BLD: 3.02 M/UL — LOW (ref 3.8–5.2)
RBC # FLD: 14.9 % — HIGH (ref 10.3–14.5)
SODIUM SERPL-SCNC: 141 MMOL/L — SIGNIFICANT CHANGE UP (ref 135–145)
WBC # BLD: 4.95 K/UL — SIGNIFICANT CHANGE UP (ref 3.8–10.5)
WBC # FLD AUTO: 4.95 K/UL — SIGNIFICANT CHANGE UP (ref 3.8–10.5)

## 2025-07-12 PROCEDURE — 87075 CULTR BACTERIA EXCEPT BLOOD: CPT

## 2025-07-12 PROCEDURE — 83735 ASSAY OF MAGNESIUM: CPT

## 2025-07-12 PROCEDURE — 80202 ASSAY OF VANCOMYCIN: CPT

## 2025-07-12 PROCEDURE — 74176 CT ABD & PELVIS W/O CONTRAST: CPT

## 2025-07-12 PROCEDURE — 71260 CT THORAX DX C+: CPT

## 2025-07-12 PROCEDURE — 87186 SC STD MICRODIL/AGAR DIL: CPT

## 2025-07-12 PROCEDURE — 36430 TRANSFUSION BLD/BLD COMPNT: CPT

## 2025-07-12 PROCEDURE — 84702 CHORIONIC GONADOTROPIN TEST: CPT

## 2025-07-12 PROCEDURE — 99285 EMERGENCY DEPT VISIT HI MDM: CPT

## 2025-07-12 PROCEDURE — 87086 URINE CULTURE/COLONY COUNT: CPT

## 2025-07-12 PROCEDURE — 80048 BASIC METABOLIC PNL TOTAL CA: CPT

## 2025-07-12 PROCEDURE — 83605 ASSAY OF LACTIC ACID: CPT

## 2025-07-12 PROCEDURE — 93005 ELECTROCARDIOGRAM TRACING: CPT

## 2025-07-12 PROCEDURE — 74177 CT ABD & PELVIS W/CONTRAST: CPT

## 2025-07-12 PROCEDURE — P9016: CPT

## 2025-07-12 PROCEDURE — 85730 THROMBOPLASTIN TIME PARTIAL: CPT

## 2025-07-12 PROCEDURE — 87070 CULTURE OTHR SPECIMN AEROBIC: CPT

## 2025-07-12 PROCEDURE — 96375 TX/PRO/DX INJ NEW DRUG ADDON: CPT

## 2025-07-12 PROCEDURE — 85025 COMPLETE CBC W/AUTO DIFF WBC: CPT

## 2025-07-12 PROCEDURE — 87077 CULTURE AEROBIC IDENTIFY: CPT

## 2025-07-12 PROCEDURE — 87205 SMEAR GRAM STAIN: CPT

## 2025-07-12 PROCEDURE — 86901 BLOOD TYPING SEROLOGIC RH(D): CPT

## 2025-07-12 PROCEDURE — 85610 PROTHROMBIN TIME: CPT

## 2025-07-12 PROCEDURE — 82550 ASSAY OF CK (CPK): CPT

## 2025-07-12 PROCEDURE — 85027 COMPLETE CBC AUTOMATED: CPT

## 2025-07-12 PROCEDURE — 80053 COMPREHEN METABOLIC PANEL: CPT

## 2025-07-12 PROCEDURE — 84300 ASSAY OF URINE SODIUM: CPT

## 2025-07-12 PROCEDURE — 81001 URINALYSIS AUTO W/SCOPE: CPT

## 2025-07-12 PROCEDURE — 99239 HOSP IP/OBS DSCHRG MGMT >30: CPT

## 2025-07-12 PROCEDURE — 86850 RBC ANTIBODY SCREEN: CPT

## 2025-07-12 PROCEDURE — 86900 BLOOD TYPING SEROLOGIC ABO: CPT

## 2025-07-12 PROCEDURE — 96374 THER/PROPH/DIAG INJ IV PUSH: CPT

## 2025-07-12 PROCEDURE — 87040 BLOOD CULTURE FOR BACTERIA: CPT

## 2025-07-12 PROCEDURE — 36415 COLL VENOUS BLD VENIPUNCTURE: CPT

## 2025-07-12 PROCEDURE — 86923 COMPATIBILITY TEST ELECTRIC: CPT

## 2025-07-12 RX ORDER — ONDANSETRON HCL/PF 4 MG/2 ML
1 VIAL (ML) INJECTION
Qty: 1 | Refills: 0
Start: 2025-07-12 | End: 2025-07-16

## 2025-07-12 RX ORDER — DOXYCYCLINE HYCLATE 100 MG
2 TABLET ORAL
Qty: 16 | Refills: 0
Start: 2025-07-12 | End: 2025-07-15

## 2025-07-12 RX ORDER — LACTOBACILLUS ACIDOPHILUS/PECT 75 MM-100
1 CAPSULE ORAL
Qty: 0 | Refills: 0 | DISCHARGE
Start: 2025-07-12

## 2025-07-12 RX ADMIN — Medication 4 MILLIGRAM(S): at 06:14

## 2025-07-12 RX ADMIN — Medication 100 MILLIGRAM(S): at 06:13

## 2025-07-12 RX ADMIN — Medication 40 MILLIEQUIVALENT(S): at 10:40

## 2025-07-12 NOTE — PROGRESS NOTE ADULT - NS ATTEND AMEND GEN_ALL_CORE FT
Left breast cellulitis, abscess    Appreciate Dr Shikowitz-Behr  Will monitor F/WBC count  ID consult on Monday  C/w vanc, zosyn  One unit PRBC now, wll monitor Hg/hct    DVT PPX  AM labs
spoke with nephro patient to get CT A/P non contrast today  updated her PCP   continue trendingCr  nephrology is following closely
Left breast cellulitis, abscess    Appreciate Dr Shikowitz-Behr  Will monitor F/WBC count  ID consult on Monday  C/w vanc, zosyn  s/p unit PRBC, wll monitor Hg/hct    DVT PPX  AM labs.
.

## 2025-07-12 NOTE — PROGRESS NOTE ADULT - SUBJECTIVE AND OBJECTIVE BOX
ct< from: CT Abdomen and Pelvis No Cont (07.11.25 @ 10:35) >    IMPRESSION:  *  Postoperative changes status post bilateral breast reconstruction with   new surgical drain in the left breast.  *  Small residual collection of fluid and gas along the inferior lateral   course of the surgical drain, approximately 3 cm from its insertion site.  *  Small bilateral pleural effusions and adjacent passive atelectasis.  *  No evidence of acute abnormality in the abdomen and pelvis.    < end of copied text >   Patient is a 49y old  Female who presents with a chief complaint of L breast cellulitis (12 Jul 2025 07:52)      Patient seen and examined at bedside. Patient states she wants to go home.     ALLERGIES:  No Known Allergies    MEDICATIONS  (STANDING):  doxycycline monohydrate Capsule 100 milliGRAM(s) Oral every 12 hours  lactobacillus acidophilus 1 Tablet(s) Oral daily  potassium chloride   Solution 40 milliEquivalent(s) Oral once  sodium chloride 0.45% with potassium chloride 20 mEq/L 1000 milliLiter(s) (75 mL/Hr) IV Continuous <Continuous>    MEDICATIONS  (PRN):  acetaminophen     Tablet .. 650 milliGRAM(s) Oral every 6 hours PRN Mild Pain (1 - 3)  morphine  - Injectable 2 milliGRAM(s) IV Push every 4 hours PRN Severe Pain (7 - 10)  ondansetron Injectable 4 milliGRAM(s) IV Push three times a day PRN Nausea and/or Vomiting  traMADol 50 milliGRAM(s) Oral every 6 hours PRN Moderate Pain (4 - 6)    Vital Signs Last 24 Hrs  T(F): 97.8 (12 Jul 2025 08:15), Max: 98.6 (11 Jul 2025 18:00)  HR: 58 (12 Jul 2025 08:15) (58 - 76)  BP: 115/73 (12 Jul 2025 08:15) (99/60 - 128/68)  RR: 18 (12 Jul 2025 08:15) (14 - 20)  SpO2: 98% (12 Jul 2025 08:15) (95% - 100%)  I&O's Summary    PHYSICAL EXAM:  General: A/O x 3  ENT: No gross hearing impairment, Moist mucous membranes, no thrush  Neck: Supple, No JVD  Lungs: Clear to auscultation bilaterally, good air entry, non-labored breathing  Cardio: RRR, S1/S2, No murmur  Abdomen: Soft, Nontender, Nondistended; Bowel sounds present  Extremities: No calf tenderness, No cyanosis, No pitting edema  Psych: Appropriate mood and affect    LABS:                        7.8    4.95  )-----------( 287      ( 12 Jul 2025 06:16 )             24.8     07-12    141  |  106  |  13  ----------------------------<  116  3.3   |  28  |  1.43    Ca    9.1      12 Jul 2025 06:16  Mg     1.9     07-11                                        Urinalysis Basic - ( 12 Jul 2025 06:16 )    Color: x / Appearance: x / SG: x / pH: x  Gluc: 116 mg/dL / Ketone: x  / Bili: x / Urobili: x   Blood: x / Protein: x / Nitrite: x   Leuk Esterase: x / RBC: x / WBC x   Sq Epi: x / Non Sq Epi: x / Bacteria: x        Culture - Abscess with Gram Stain (collected 05 Jul 2025 10:20)  Source: Abscess left breast  Gram Stain (06 Jul 2025 00:45):    Moderate polymorphonuclear leukocytes seen per low power field    Few Gram Positive Cocci in Clusters seen per oil power field  Final Report (10 Jul 2025 18:28):    Numerous Staphylococcus aureus  Organism: Staphylococcus aureus (10 Jul 2025 18:28)  Organism: Staphylococcus aureus (10 Jul 2025 18:28)      -  Clindamycin: S <=0.25      -  Oxacillin: S 0.5 Oxacillin predicts susceptibility for dicloxacillin, methicillin, and nafcillin      -  Gentamicin: S <=4 Should not be used as monotherapy      -  Vancomycin: S 1      -  Tetracycline: S <=4      Method Type: MICKY      -  Penicillin: R >2      -  Rifampin: S <=1 Should not be used as monotherapy      -  Erythromycin: R >4      -  Trimethoprim/Sulfamethoxazole: S <=0.5/9.5        RADIOLOGY & ADDITIONAL TESTS:    Care Discussed with Consultants/Other Providers:           ct< from: CT Abdomen and Pelvis No Cont (07.11.25 @ 10:35) >    IMPRESSION:  *  Postoperative changes status post bilateral breast reconstruction with   new surgical drain in the left breast.  *  Small residual collection of fluid and gas along the inferior lateral   course of the surgical drain, approximately 3 cm from its insertion site.  *  Small bilateral pleural effusions and adjacent passive atelectasis.  *  No evidence of acute abnormality in the abdomen and pelvis.    < end of copied text >

## 2025-07-12 NOTE — PROGRESS NOTE ADULT - ASSESSMENT
Patient is a 49y old  Female who presents with a chief complaint of L breast cellulitis (12 Jul 2025 07:52)  pt POD # 6 s/p left breast washout and drain placement  pt stable surgically, awaiting nephrology clearance for discharge home due to RICARDO  no events noted overnight  pt feels well, denies pain      pt doing well from surgical standpoint    Cr trending down, OK for discharge from nephrology standpoint    follow up with Dr. German 7/18/25    case d/w Dr. Shikowitz-Behr and ALBINA Cazares

## 2025-07-12 NOTE — PROGRESS NOTE ADULT - SUBJECTIVE AND OBJECTIVE BOX
Patient is a 49y old  Female who presents with a chief complaint of L breast cellulitis (12 Jul 2025 07:52)  pt POD # 6 s/p left breast washout and drain placement  pt stable surgically, awaiting nephrology clearance for discharge home due to RICARDO  no events noted overnight  pt feels well, denies pain    Patient seen and examined at bedside.    ALLERGIES:  No Known Allergies    MEDICATIONS  (STANDING):  doxycycline monohydrate Capsule 100 milliGRAM(s) Oral every 12 hours  lactobacillus acidophilus 1 Tablet(s) Oral daily  sodium chloride 0.45% with potassium chloride 20 mEq/L 1000 milliLiter(s) (75 mL/Hr) IV Continuous <Continuous>    MEDICATIONS  (PRN):  acetaminophen     Tablet .. 650 milliGRAM(s) Oral every 6 hours PRN Mild Pain (1 - 3)  morphine  - Injectable 2 milliGRAM(s) IV Push every 4 hours PRN Severe Pain (7 - 10)  ondansetron Injectable 4 milliGRAM(s) IV Push three times a day PRN Nausea and/or Vomiting  traMADol 50 milliGRAM(s) Oral every 6 hours PRN Moderate Pain (4 - 6)    Vital Signs Last 24 Hrs  T(F): 97.8 (12 Jul 2025 08:15), Max: 98.6 (11 Jul 2025 18:00)  HR: 58 (12 Jul 2025 08:15) (58 - 67)  BP: 115/73 (12 Jul 2025 08:15) (99/60 - 118/76)  RR: 18 (12 Jul 2025 08:15) (17 - 19)  SpO2: 98% (12 Jul 2025 08:15) (96% - 98%)  I&O's Summary    PHYSICAL EXAM:  General: NAD, A/O x 3  ENT: MMM  Neck: Supple, No JVD  left breast  - no erythema, no swelling, slightly firm, incision c/d/i, drain with scant serous drainage   Abdomen: Soft, Nontender, Nondistended; Bowel sounds present  Extremities: No calf tenderness, No pitting edema    LABS:                        7.8    4.95  )-----------( 287      ( 12 Jul 2025 06:16 )             24.8     07-12    141  |  106  |  13  ----------------------------<  116  3.3   |  28  |  1.43    Ca    9.1      12 Jul 2025 06:16  Mg     1.9     07-11                                  Urinalysis Basic - ( 12 Jul 2025 06:16 )    Color: x / Appearance: x / SG: x / pH: x  Gluc: 116 mg/dL / Ketone: x  / Bili: x / Urobili: x   Blood: x / Protein: x / Nitrite: x   Leuk Esterase: x / RBC: x / WBC x   Sq Epi: x / Non Sq Epi: x / Bacteria: x            RADIOLOGY & ADDITIONAL TESTS:    Care Discussed with Consultants/Other Providers:

## 2025-07-12 NOTE — PROGRESS NOTE ADULT - ASSESSMENT
48 y/o female w/ PMH of nephrolithiasis, left breast cancer s/p B/L mastectomy and ADOLFO on 6/17/2025 with left breast cellulitis and collection likely hematoma/seroma per CT and acute anemia.    #Sepsis (present on admission; , WBC 13.4) due to left breast abscess and cellulitis  #POD #6 from washout  - Pt. is > 2.5 weeks s/p bilateral mastectomy and ADOLFO flap  - CT C/A/P: lateral left breast - 3.3 x 2.5 x 4.9 cm collection with surrounding fat stranding, likely postop hematoma/seroma, superimposed infection is not definitively excluded. At the medial aspect of the left breast there is a small amount of subcutaneous fluid without definite organization  - 7/6 s/p of purulent fluid was aspirated from the lateral chest pocket by surgical PA > cultures sent > growing gram staph aureus > sensitivities reviewed  - 7/4 blood cultures no growth  - continue Oral doxy   - s/p vancomycin, paused due to RICARDO  - Surgery following, s/p washout, pt currently AVSS. Exam much improved. Erythema resolved   - ID following, 4 more days of doxycycline    #ATN  - Cr trended down to 1.4 this AM   - CTAP reviewed as above   - Monitor BMP  - avoid nephrotoxins   - Nephro - RICARDO possibly septic/hemodynamic and/or Vanco related, OK for d/c and follow up with PCP     #Left breast seroma/hematoma  #Anemia  - Hgb stable, s/p 1U PRBC   - Keep active T+S  - Trend CBC     #Hypokalemia  - repleted     #VTE ppx: SCDs for now    #GOC: Full code    Dispo: plan for D/c today on Oral doxy for 4 more days      Spoke to patient PMD Dr. Cheng as per her request. Patient updated on plan of care    50 y/o female w/ PMH of nephrolithiasis, left breast cancer s/p B/L mastectomy and ADOLFO on 6/17/2025 with left breast cellulitis and collection likely hematoma/seroma per CT and acute anemia.    #Sepsis (present on admission; , WBC 13.4) due to left breast abscess and cellulitis  #POD6 from washout  - Pt. is > 2.5 weeks s/p bilateral mastectomy and ADOLFO flap  - CT C/A/P: lateral left breast - 3.3 x 2.5 x 4.9 cm collection with surrounding fat stranding, likely postop hematoma/seroma, superimposed infection is not definitively excluded. At the medial aspect of the left breast there is a small amount of subcutaneous fluid without definite organization  - 7/6 s/p of purulent fluid was aspirated from the lateral chest pocket by surgical PA > cultures sent > growing gram staph aureus > sensitivities reviewed  - 7/4 blood cultures no growth  - continue Oral doxy  - s/p vancomycin, paused due to RICARDO  - Surgery following, s/p washout, pt currently AVSS. Exam much improved. Erythema resolved   - ID following, 4 more days of doxycycline    #ATN  - Cr trended down to 1.4 this AM   - CTAP reviewed as above   - Monitor BMP  - avoid nephrotoxins   - Nephro - RICARDO possibly septic/hemodynamic and/or Vanco related, OK for d/c and follow up with PCP     #Left breast seroma/hematoma  #Anemia  - Hgb stable, s/p 1U PRBC   - Keep active T+S  - Trend CBC     #Hypokalemia  - repleted     #VTE ppx: SCDs for now    #GOC: Full code    Dispo: plan for d/c today on Oral doxy for 4 more days      Spoke to patient PMD Dr. Cheng as per her request. Patient updated on plan of care and in agreement    48 y/o female w/ PMH of nephrolithiasis, left breast cancer s/p B/L mastectomy and ADOLFO on 6/17/2025 with left breast cellulitis and collection likely hematoma/seroma per CT and acute anemia.    #Sepsis (present on admission; , WBC 13.4) due to left breast abscess and cellulitis  #POD6 from washout  - Pt. is > 2.5 weeks s/p bilateral mastectomy and ADOLFO flap  - CT C/A/P: lateral left breast - 3.3 x 2.5 x 4.9 cm collection with surrounding fat stranding, likely postop hematoma/seroma, superimposed infection is not definitively excluded. At the medial aspect of the left breast there is a small amount of subcutaneous fluid without definite organization  - 7/6 s/p of purulent fluid was aspirated from the lateral chest pocket by surgical PA > cultures sent > growing gram staph aureus > sensitivities reviewed  - 7/4 blood cultures no growth  - continue Oral doxy  - s/p vancomycin, paused due to RICARDO  - Surgery following, s/p washout, pt currently AVSS. Exam much improved. Erythema resolved   - ID following, 4 more days of doxycycline    #ATN  - Cr trended down to 1.4 this AM   - CTAP reviewed as above   - Monitor BMP  - avoid nephrotoxins   - Nephro - RICARDO possibly septic/hemodynamic and/or Vanco related, OK for d/c and follow up with PCP     #Left breast seroma/hematoma  #Anemia  - Hgb stable, s/p 1U PRBC   - Keep active T+S  - Trend CBC     #Hypokalemia  - repleted     #VTE ppx: SCDs for now    #GOC: Full code    Dispo: plan for d/c today on Oral doxy for 4 more days      Spoke to patient PMD Dr. Ho as per her request. Patient updated on plan of care and in agreement   Dr. Ho WILL ARRANGE FOR BMP TO BE DRAWN EARLY IN WEEK

## 2025-07-12 NOTE — PROGRESS NOTE ADULT - PROVIDER SPECIALTY LIST ADULT
Hospitalist
Nephrology
Plastic Surgery
Surgery
Infectious Disease
Nephrology
Plastic Surgery
Hospitalist
Nephrology
Plastic Surgery
Hospitalist
Hospitalist
Plastic Surgery
Hospitalist
Hospitalist

## 2025-07-12 NOTE — PROGRESS NOTE ADULT - TIME BILLING
- Ordering, reviewing, and interpreting labs, testing, and imaging.  - Independently obtaining a review of systems and performing a physical exam  - Reviewing consultant documentation/recommendations.  - Counselling and educating patient regarding interpretation of aforementioned items and plan of care.
- Ordering, reviewing, and interpreting labs, testing, and imaging.  - Independently obtaining a review of systems and performing a physical exam  - Reviewing consultant documentation/recommendations.  - Counselling and educating patient regarding interpretation of aforementioned items and plan of care.
Time spent includes direct patient care  (interview and examination of patient), discussion with other providers, support staff and/or patient's family members, review of medical records, ordering diagnostic tests and analyzing results, and documentation.
Time spent includes direct patient care  (interview and examination of patient), discussion with other providers, support staff and/or patient's family members, review of medical records, ordering diagnostic tests and analyzing results, and documentation.
- Ordering, reviewing, and interpreting labs, testing, and imaging.  - Independently obtaining a review of systems and performing a physical exam  - Reviewing consultant documentation/recommendations.  - Counselling and educating patient regarding interpretation of aforementioned items and plan of care.
Time spent includes direct patient care  (interview and examination of patient), discussion with other providers, support staff and/or patient's family members, review of medical records, ordering diagnostic tests and analyzing results, and documentation.
- Ordering, reviewing, and interpreting labs, testing, and imaging.  - Independently obtaining a review of systems and performing a physical exam  - Reviewing consultant documentation/recommendations.  - Counselling and educating patient regarding interpretation of aforementioned items and plan of care.
Time spent includes direct patient care  (interview and examination of patient), discussion with other providers, support staff and/or patient's family members, review of medical records, ordering diagnostic tests and analyzing results, and documentation.

## 2025-07-14 ENCOUNTER — TRANSCRIPTION ENCOUNTER (OUTPATIENT)
Age: 50
End: 2025-07-14

## 2025-07-14 ENCOUNTER — APPOINTMENT (OUTPATIENT)
Dept: PLASTIC SURGERY | Facility: CLINIC | Age: 50
End: 2025-07-14
Payer: COMMERCIAL

## 2025-07-14 PROBLEM — N61.0 CELLULITIS OF BREAST: Status: ACTIVE | Noted: 2025-07-03

## 2025-07-14 LAB
CULTURE RESULTS: ABNORMAL
CULTURE RESULTS: NO GROWTH — SIGNIFICANT CHANGE UP
ORGANISM # SPEC MICROSCOPIC CNT: ABNORMAL
ORGANISM # SPEC MICROSCOPIC CNT: SIGNIFICANT CHANGE UP
SPECIMEN SOURCE: SIGNIFICANT CHANGE UP
SPECIMEN SOURCE: SIGNIFICANT CHANGE UP

## 2025-07-14 PROCEDURE — 99024 POSTOP FOLLOW-UP VISIT: CPT

## 2025-07-14 RX ORDER — DOXYCYCLINE HYCLATE 100 MG/1
100 TABLET, COATED ORAL TWICE DAILY
Qty: 28 | Refills: 0 | Status: ACTIVE | COMMUNITY
Start: 2025-07-14 | End: 1900-01-01

## 2025-07-18 ENCOUNTER — APPOINTMENT (OUTPATIENT)
Dept: PLASTIC SURGERY | Facility: CLINIC | Age: 50
End: 2025-07-18
Payer: COMMERCIAL

## 2025-07-18 VITALS
SYSTOLIC BLOOD PRESSURE: 128 MMHG | BODY MASS INDEX: 27 KG/M2 | DIASTOLIC BLOOD PRESSURE: 77 MMHG | HEIGHT: 61 IN | RESPIRATION RATE: 16 BRPM | OXYGEN SATURATION: 100 % | TEMPERATURE: 98.1 F | WEIGHT: 143 LBS | HEART RATE: 64 BPM

## 2025-07-18 PROCEDURE — 99024 POSTOP FOLLOW-UP VISIT: CPT

## 2025-07-18 RX ORDER — ONDANSETRON 4 MG/1
4 TABLET, ORALLY DISINTEGRATING ORAL EVERY 6 HOURS
Qty: 30 | Refills: 0 | Status: ACTIVE | COMMUNITY
Start: 2025-07-18 | End: 1900-01-01

## 2025-07-25 ENCOUNTER — APPOINTMENT (OUTPATIENT)
Dept: PLASTIC SURGERY | Facility: CLINIC | Age: 50
End: 2025-07-25
Payer: COMMERCIAL

## 2025-07-25 VITALS
HEART RATE: 64 BPM | OXYGEN SATURATION: 100 % | WEIGHT: 143 LBS | DIASTOLIC BLOOD PRESSURE: 64 MMHG | SYSTOLIC BLOOD PRESSURE: 103 MMHG | BODY MASS INDEX: 27 KG/M2 | TEMPERATURE: 98.5 F | HEIGHT: 61 IN | RESPIRATION RATE: 14 BRPM

## 2025-07-25 PROCEDURE — 99024 POSTOP FOLLOW-UP VISIT: CPT

## 2025-07-28 ENCOUNTER — APPOINTMENT (OUTPATIENT)
Dept: PHYSICAL MEDICINE AND REHAB | Facility: CLINIC | Age: 50
End: 2025-07-28
Payer: COMMERCIAL

## 2025-07-28 PROCEDURE — 99204 OFFICE O/P NEW MOD 45 MIN: CPT

## 2025-07-31 ENCOUNTER — NON-APPOINTMENT (OUTPATIENT)
Age: 50
End: 2025-07-31

## 2025-07-31 RX ORDER — DEXAMETHASONE 4 MG/1
4 TABLET ORAL
Qty: 24 | Refills: 1 | Status: ACTIVE | COMMUNITY
Start: 2025-07-31 | End: 1900-01-01

## 2025-07-31 RX ORDER — METOCLOPRAMIDE 10 MG/1
10 TABLET ORAL 4 TIMES DAILY
Qty: 30 | Refills: 1 | Status: ACTIVE | COMMUNITY
Start: 2025-07-31 | End: 1900-01-01

## 2025-08-03 PROBLEM — Z90.13 ABSENCE OF BREAST, ACQUIRED, BILATERAL: Status: ACTIVE | Noted: 2025-06-23

## 2025-08-04 ENCOUNTER — APPOINTMENT (OUTPATIENT)
Dept: PLASTIC SURGERY | Facility: CLINIC | Age: 50
End: 2025-08-04
Payer: COMMERCIAL

## 2025-08-04 DIAGNOSIS — Z90.13 ACQUIRED ABSENCE OF BILATERAL BREASTS AND NIPPLES: ICD-10-CM

## 2025-08-04 PROCEDURE — 99024 POSTOP FOLLOW-UP VISIT: CPT

## 2025-08-06 ENCOUNTER — APPOINTMENT (OUTPATIENT)
Dept: HEMATOLOGY ONCOLOGY | Facility: CLINIC | Age: 50
End: 2025-08-06
Payer: COMMERCIAL

## 2025-08-06 ENCOUNTER — RESULT REVIEW (OUTPATIENT)
Age: 50
End: 2025-08-06

## 2025-08-06 ENCOUNTER — NON-APPOINTMENT (OUTPATIENT)
Age: 50
End: 2025-08-06

## 2025-08-06 ENCOUNTER — APPOINTMENT (OUTPATIENT)
Dept: INFUSION THERAPY | Facility: HOSPITAL | Age: 50
End: 2025-08-06

## 2025-08-06 ENCOUNTER — APPOINTMENT (OUTPATIENT)
Dept: HEMATOLOGY ONCOLOGY | Facility: CLINIC | Age: 50
End: 2025-08-06

## 2025-08-06 DIAGNOSIS — Z17.0 MALIGNANT NEOPLASM OF UNSPECIFIED SITE OF LEFT FEMALE BREAST: ICD-10-CM

## 2025-08-06 DIAGNOSIS — Z15.01 GENETIC SUSCEPTIBILITY TO MALIGNANT NEOPLASM OF BREAST: ICD-10-CM

## 2025-08-06 DIAGNOSIS — C50.912 MALIGNANT NEOPLASM OF UNSPECIFIED SITE OF LEFT FEMALE BREAST: ICD-10-CM

## 2025-08-06 DIAGNOSIS — Z15.09 GENETIC SUSCEPTIBILITY TO MALIGNANT NEOPLASM OF BREAST: ICD-10-CM

## 2025-08-06 LAB
ALBUMIN SERPL ELPH-MCNC: 4.4 G/DL — SIGNIFICANT CHANGE UP (ref 3.3–5)
ALP SERPL-CCNC: 74 U/L — SIGNIFICANT CHANGE UP (ref 40–120)
ALT FLD-CCNC: 11 U/L — SIGNIFICANT CHANGE UP (ref 10–45)
ANION GAP SERPL CALC-SCNC: 16 MMOL/L — SIGNIFICANT CHANGE UP (ref 5–17)
AST SERPL-CCNC: 20 U/L — SIGNIFICANT CHANGE UP (ref 10–40)
BASOPHILS # BLD AUTO: 0.01 K/UL — SIGNIFICANT CHANGE UP (ref 0–0.2)
BASOPHILS NFR BLD AUTO: 0.1 % — SIGNIFICANT CHANGE UP (ref 0–2)
BILIRUB SERPL-MCNC: 0.5 MG/DL — SIGNIFICANT CHANGE UP (ref 0.2–1.2)
BUN SERPL-MCNC: 26 MG/DL — HIGH (ref 7–23)
CALCIUM SERPL-MCNC: 9.8 MG/DL — SIGNIFICANT CHANGE UP (ref 8.4–10.5)
CHLORIDE SERPL-SCNC: 103 MMOL/L — SIGNIFICANT CHANGE UP (ref 96–108)
CO2 SERPL-SCNC: 20 MMOL/L — LOW (ref 22–31)
CREAT SERPL-MCNC: 0.51 MG/DL — SIGNIFICANT CHANGE UP (ref 0.5–1.3)
EGFR: 115 ML/MIN/1.73M2 — SIGNIFICANT CHANGE UP
EGFR: 115 ML/MIN/1.73M2 — SIGNIFICANT CHANGE UP
EOSINOPHIL # BLD AUTO: 0 K/UL — SIGNIFICANT CHANGE UP (ref 0–0.5)
EOSINOPHIL NFR BLD AUTO: 0 % — SIGNIFICANT CHANGE UP (ref 0–6)
GLUCOSE SERPL-MCNC: 128 MG/DL — HIGH (ref 70–99)
HBV CORE AB SER-ACNC: SIGNIFICANT CHANGE UP
HBV SURFACE AB SER-ACNC: ABNORMAL
HBV SURFACE AG SER-ACNC: SIGNIFICANT CHANGE UP
HCT VFR BLD CALC: 30.1 % — LOW (ref 34.5–45)
HCV AB S/CO SERPL IA: 0.11 S/CO — SIGNIFICANT CHANGE UP (ref 0–0.79)
HCV AB SERPL-IMP: SIGNIFICANT CHANGE UP
HGB BLD-MCNC: 9.6 G/DL — LOW (ref 11.5–15.5)
IMM GRANULOCYTES NFR BLD AUTO: 0.1 % — SIGNIFICANT CHANGE UP (ref 0–0.9)
LYMPHOCYTES # BLD AUTO: 0.62 K/UL — LOW (ref 1–3.3)
LYMPHOCYTES # BLD AUTO: 8.3 % — LOW (ref 13–44)
MCHC RBC-ENTMCNC: 24.9 PG — LOW (ref 27–34)
MCHC RBC-ENTMCNC: 32 G/DL — SIGNIFICANT CHANGE UP (ref 32–36)
MCV RBC AUTO: 77.9 FL — LOW (ref 80–100)
MONOCYTES # BLD AUTO: 0.33 K/UL — SIGNIFICANT CHANGE UP (ref 0–0.9)
MONOCYTES NFR BLD AUTO: 4.4 % — SIGNIFICANT CHANGE UP (ref 2–14)
NEUTROPHILS # BLD AUTO: 6.51 K/UL — SIGNIFICANT CHANGE UP (ref 1.8–7.4)
NEUTROPHILS NFR BLD AUTO: 87.1 % — HIGH (ref 43–77)
NRBC BLD AUTO-RTO: 0 /100 WBCS — SIGNIFICANT CHANGE UP (ref 0–0)
PLATELET # BLD AUTO: 204 K/UL — SIGNIFICANT CHANGE UP (ref 150–400)
POTASSIUM SERPL-MCNC: 3.8 MMOL/L — SIGNIFICANT CHANGE UP (ref 3.5–5.3)
POTASSIUM SERPL-SCNC: 3.8 MMOL/L — SIGNIFICANT CHANGE UP (ref 3.5–5.3)
PROT SERPL-MCNC: 7.6 G/DL — SIGNIFICANT CHANGE UP (ref 6–8.3)
RBC # BLD: 3.85 M/UL — SIGNIFICANT CHANGE UP (ref 3.8–5.2)
RBC # FLD: 15.5 % — HIGH (ref 10.3–14.5)
SODIUM SERPL-SCNC: 139 MMOL/L — SIGNIFICANT CHANGE UP (ref 135–145)
WBC # BLD: 7.48 K/UL — SIGNIFICANT CHANGE UP (ref 3.8–10.5)
WBC # FLD AUTO: 7.48 K/UL — SIGNIFICANT CHANGE UP (ref 3.8–10.5)

## 2025-08-06 PROCEDURE — G2211 COMPLEX E/M VISIT ADD ON: CPT

## 2025-08-06 PROCEDURE — 93010 ELECTROCARDIOGRAM REPORT: CPT

## 2025-08-06 PROCEDURE — 99215 OFFICE O/P EST HI 40 MIN: CPT

## 2025-08-06 PROCEDURE — 99214 OFFICE O/P EST MOD 30 MIN: CPT

## 2025-08-07 DIAGNOSIS — Z51.89 ENCOUNTER FOR OTHER SPECIFIED AFTERCARE: ICD-10-CM

## 2025-08-07 DIAGNOSIS — R11.2 NAUSEA WITH VOMITING, UNSPECIFIED: ICD-10-CM

## 2025-08-07 DIAGNOSIS — E86.0 DEHYDRATION: ICD-10-CM

## 2025-08-07 DIAGNOSIS — Z51.11 ENCOUNTER FOR ANTINEOPLASTIC CHEMOTHERAPY: ICD-10-CM

## 2025-08-08 PROBLEM — C50.912 MALIGNANT NEOPLASM OF UNSPECIFIED SITE OF LEFT FEMALE BREAST: Chronic | Status: ACTIVE | Noted: 2025-07-31

## 2025-08-27 ENCOUNTER — NON-APPOINTMENT (OUTPATIENT)
Age: 50
End: 2025-08-27

## 2025-08-27 ENCOUNTER — RESULT REVIEW (OUTPATIENT)
Age: 50
End: 2025-08-27

## 2025-08-27 ENCOUNTER — APPOINTMENT (OUTPATIENT)
Dept: INFUSION THERAPY | Facility: HOSPITAL | Age: 50
End: 2025-08-27

## 2025-08-27 ENCOUNTER — APPOINTMENT (OUTPATIENT)
Dept: HEMATOLOGY ONCOLOGY | Facility: CLINIC | Age: 50
End: 2025-08-27
Payer: COMMERCIAL

## 2025-08-27 ENCOUNTER — APPOINTMENT (OUTPATIENT)
Dept: HEMATOLOGY ONCOLOGY | Facility: CLINIC | Age: 50
End: 2025-08-27

## 2025-08-27 DIAGNOSIS — C50.912 MALIGNANT NEOPLASM OF UNSPECIFIED SITE OF LEFT FEMALE BREAST: ICD-10-CM

## 2025-08-27 DIAGNOSIS — Z15.09 GENETIC SUSCEPTIBILITY TO MALIGNANT NEOPLASM OF BREAST: ICD-10-CM

## 2025-08-27 DIAGNOSIS — Z17.0 MALIGNANT NEOPLASM OF UNSPECIFIED SITE OF LEFT FEMALE BREAST: ICD-10-CM

## 2025-08-27 DIAGNOSIS — Z15.01 GENETIC SUSCEPTIBILITY TO MALIGNANT NEOPLASM OF BREAST: ICD-10-CM

## 2025-08-27 LAB
ALBUMIN SERPL ELPH-MCNC: 4.5 G/DL — SIGNIFICANT CHANGE UP (ref 3.3–5)
ALP SERPL-CCNC: 81 U/L — SIGNIFICANT CHANGE UP (ref 40–120)
ALT FLD-CCNC: 17 U/L — SIGNIFICANT CHANGE UP (ref 10–45)
ANION GAP SERPL CALC-SCNC: 16 MMOL/L — SIGNIFICANT CHANGE UP (ref 5–17)
AST SERPL-CCNC: 20 U/L — SIGNIFICANT CHANGE UP (ref 10–40)
BASOPHILS # BLD AUTO: 0.01 K/UL — SIGNIFICANT CHANGE UP (ref 0–0.2)
BASOPHILS NFR BLD AUTO: 0.1 % — SIGNIFICANT CHANGE UP (ref 0–2)
BILIRUB SERPL-MCNC: 0.5 MG/DL — SIGNIFICANT CHANGE UP (ref 0.2–1.2)
BUN SERPL-MCNC: 28 MG/DL — HIGH (ref 7–23)
CALCIUM SERPL-MCNC: 9.8 MG/DL — SIGNIFICANT CHANGE UP (ref 8.4–10.5)
CHLORIDE SERPL-SCNC: 104 MMOL/L — SIGNIFICANT CHANGE UP (ref 96–108)
CO2 SERPL-SCNC: 20 MMOL/L — LOW (ref 22–31)
CREAT SERPL-MCNC: 0.5 MG/DL — SIGNIFICANT CHANGE UP (ref 0.5–1.3)
EGFR: 116 ML/MIN/1.73M2 — SIGNIFICANT CHANGE UP
EGFR: 116 ML/MIN/1.73M2 — SIGNIFICANT CHANGE UP
EOSINOPHIL # BLD AUTO: 0 K/UL — SIGNIFICANT CHANGE UP (ref 0–0.5)
EOSINOPHIL NFR BLD AUTO: 0 % — SIGNIFICANT CHANGE UP (ref 0–6)
GLUCOSE SERPL-MCNC: 141 MG/DL — HIGH (ref 70–99)
HCT VFR BLD CALC: 30.9 % — LOW (ref 34.5–45)
HGB BLD-MCNC: 9.9 G/DL — LOW (ref 11.5–15.5)
IMM GRANULOCYTES NFR BLD AUTO: 0.3 % — SIGNIFICANT CHANGE UP (ref 0–0.9)
LYMPHOCYTES # BLD AUTO: 0.85 K/UL — LOW (ref 1–3.3)
LYMPHOCYTES # BLD AUTO: 7.3 % — LOW (ref 13–44)
MCHC RBC-ENTMCNC: 24.4 PG — LOW (ref 27–34)
MCHC RBC-ENTMCNC: 32 G/DL — SIGNIFICANT CHANGE UP (ref 32–36)
MCV RBC AUTO: 76.3 FL — LOW (ref 80–100)
MONOCYTES # BLD AUTO: 0.4 K/UL — SIGNIFICANT CHANGE UP (ref 0–0.9)
MONOCYTES NFR BLD AUTO: 3.5 % — SIGNIFICANT CHANGE UP (ref 2–14)
NEUTROPHILS # BLD AUTO: 10.3 K/UL — HIGH (ref 1.8–7.4)
NEUTROPHILS NFR BLD AUTO: 88.8 % — HIGH (ref 43–77)
NRBC BLD AUTO-RTO: 0 /100 WBCS — SIGNIFICANT CHANGE UP (ref 0–0)
PLATELET # BLD AUTO: 544 K/UL — HIGH (ref 150–400)
POTASSIUM SERPL-MCNC: 3.6 MMOL/L — SIGNIFICANT CHANGE UP (ref 3.5–5.3)
POTASSIUM SERPL-SCNC: 3.6 MMOL/L — SIGNIFICANT CHANGE UP (ref 3.5–5.3)
PROT SERPL-MCNC: 7.6 G/DL — SIGNIFICANT CHANGE UP (ref 6–8.3)
RBC # BLD: 4.05 M/UL — SIGNIFICANT CHANGE UP (ref 3.8–5.2)
RBC # FLD: 16.4 % — HIGH (ref 10.3–14.5)
SODIUM SERPL-SCNC: 140 MMOL/L — SIGNIFICANT CHANGE UP (ref 135–145)
WBC # BLD: 11.59 K/UL — HIGH (ref 3.8–10.5)
WBC # FLD AUTO: 11.59 K/UL — HIGH (ref 3.8–10.5)

## 2025-08-27 PROCEDURE — G2211 COMPLEX E/M VISIT ADD ON: CPT

## 2025-08-27 PROCEDURE — 99215 OFFICE O/P EST HI 40 MIN: CPT

## 2025-08-27 PROCEDURE — 99214 OFFICE O/P EST MOD 30 MIN: CPT

## 2025-09-17 ENCOUNTER — RESULT REVIEW (OUTPATIENT)
Age: 50
End: 2025-09-17

## 2025-09-17 ENCOUNTER — APPOINTMENT (OUTPATIENT)
Dept: HEMATOLOGY ONCOLOGY | Facility: CLINIC | Age: 50
End: 2025-09-17
Payer: COMMERCIAL

## 2025-09-17 ENCOUNTER — APPOINTMENT (OUTPATIENT)
Dept: INFUSION THERAPY | Facility: HOSPITAL | Age: 50
End: 2025-09-17

## 2025-09-17 DIAGNOSIS — C50.912 MALIGNANT NEOPLASM OF UNSPECIFIED SITE OF LEFT FEMALE BREAST: ICD-10-CM

## 2025-09-17 DIAGNOSIS — Z15.01 GENETIC SUSCEPTIBILITY TO MALIGNANT NEOPLASM OF BREAST: ICD-10-CM

## 2025-09-17 DIAGNOSIS — Z15.09 GENETIC SUSCEPTIBILITY TO MALIGNANT NEOPLASM OF BREAST: ICD-10-CM

## 2025-09-17 DIAGNOSIS — Z17.0 MALIGNANT NEOPLASM OF UNSPECIFIED SITE OF LEFT FEMALE BREAST: ICD-10-CM

## 2025-09-17 PROCEDURE — 99214 OFFICE O/P EST MOD 30 MIN: CPT

## 2025-09-17 PROCEDURE — G2211 COMPLEX E/M VISIT ADD ON: CPT

## 2025-09-18 ENCOUNTER — TRANSCRIPTION ENCOUNTER (OUTPATIENT)
Age: 50
End: 2025-09-18

## 2025-09-19 ENCOUNTER — TRANSCRIPTION ENCOUNTER (OUTPATIENT)
Age: 50
End: 2025-09-19

## (undated) DEVICE — MERCIAN VISABILITY BACKROUND YELLOW

## (undated) DEVICE — SUT PROLENE 5-0 18" P-3

## (undated) DEVICE — WARMING BLANKET FULL ADULT

## (undated) DEVICE — SUT MONOCRYL 3-0 27" PS-2 UNDYED

## (undated) DEVICE — BAG DECANTER DISP

## (undated) DEVICE — NDL COUNTER FOAM AND MAGNET 20-40

## (undated) DEVICE — MARKING PEN DEVON X-FINE TIP W RULER

## (undated) DEVICE — VENODYNE/SCD SLEEVE CALF MEDIUM

## (undated) DEVICE — GOWN LG

## (undated) DEVICE — DRSG KERLIX ROLL LG 4.5"

## (undated) DEVICE — SUT MONOCRYL 4-0 27" PS-2 UNDYED

## (undated) DEVICE — LABELS BLANK W PEN

## (undated) DEVICE — DRSG TELFA 2 X 3

## (undated) DEVICE — Device

## (undated) DEVICE — DRSG MAMMARY SUPPORT XL SIZE 5

## (undated) DEVICE — MARKING PEN WRITESITE PLUS

## (undated) DEVICE — SPEAR SURG EYE WECK-CELL CELOS

## (undated) DEVICE — GLV 8 PROTEXIS (WHITE)

## (undated) DEVICE — DRAIN RESERVOIR FOR JACKSON PRATT 100CC CARDINAL

## (undated) DEVICE — SOL IRR POUR NS 0.9% 500ML

## (undated) DEVICE — DRSG DERMABOND PRINEO 42CM

## (undated) DEVICE — DRAPE 3/4 SHEET 52X76"

## (undated) DEVICE — STAPLER SKIN VISI-STAT 35 WIDE

## (undated) DEVICE — PROTECTOR HEEL / ELBOW FLUFFY

## (undated) DEVICE — ELCTR BOVIE PENCIL BLADE 10FT

## (undated) DEVICE — ELCTR GROUNDING PAD ADULT COVIDIEN

## (undated) DEVICE — MARKING PEN W RULER

## (undated) DEVICE — PACK MINOR

## (undated) DEVICE — SUT VICRYL 2-0 18" CT-1 UNDYED (POP-OFF)

## (undated) DEVICE — SUT MONOCRYL 3-0 18" PS-2 UNDYED

## (undated) DEVICE — BASIN SET DOUBLE

## (undated) DEVICE — DRSG STERISTRIPS 0.5 X 4"

## (undated) DEVICE — SUT QUILL MONODERM 2-0 14CM 26MM UNDYED

## (undated) DEVICE — DRAPE IOBAN 23" X 23"

## (undated) DEVICE — DRSG MAMMARY SUPPORT LG SIZE 4

## (undated) DEVICE — POSITIONER FOAM OR TABLE PAD CONVOLUTED (PINK)

## (undated) DEVICE — DRAPE CHEST BREAST 106" X 122"

## (undated) DEVICE — POSITIONER STRAP ARMBOARD VELCRO TS-30

## (undated) DEVICE — DRAPE WARMING SOLUTION 44 X 44"

## (undated) DEVICE — SOL IRR POUR H2O 500ML

## (undated) DEVICE — DRAPE SPLIT SHEET 77" X 120"

## (undated) DEVICE — SUT PROLENE 5-0 36" RB-1

## (undated) DEVICE — VESSEL LOOP MINI-BLUE 0.075" X 16"

## (undated) DEVICE — ELCTR STRYKER NEPTUNE SMOKE EVACUATION PENCIL (GREEN)

## (undated) DEVICE — SUT SOFSILK 2-0 18" C-15

## (undated) DEVICE — CANISTER DISPOSABLE THIN WALL 3000CC

## (undated) DEVICE — SUT MONOCRYL 3-0 27" SH

## (undated) DEVICE — ELCTR BOVIE TIP BLADE INSULATED 2.75" EDGE

## (undated) DEVICE — DRSG BIOPATCH DISK W CHG 1" W 4.0MM HOLE

## (undated) DEVICE — PACK BREAST MINOR

## (undated) DEVICE — SUT VICRYL PLUS 0 18" CT-1 UNDYED (POP-OFF)

## (undated) DEVICE — ULTRASOUND GEL 0.25L

## (undated) DEVICE — LONE STAR RETRACTOR RING 12MM BLUNT DISP

## (undated) DEVICE — TUBING IV EXTENSION MACRO W CLAVE 7"

## (undated) DEVICE — DRSG MAMMARY SUPPORT MED SIZE 3

## (undated) DEVICE — SOL IRR POUR NS 0.9% 1000ML

## (undated) DEVICE — DRSG DERMABOND 0.7ML

## (undated) DEVICE — VISITEC 4X4

## (undated) DEVICE — PACK FREE FLAP

## (undated) DEVICE — FOLEY TRAY 16FR 5CC LF UMETER CLOSED

## (undated) DEVICE — WOUND IRR IRRISEPT W 0.5 CHG

## (undated) DEVICE — DRAPE MICROSCOPE LEICA 54" X 150"

## (undated) DEVICE — NDL HYPO REGULAR BEVEL 25G X 1.5" (BLUE)

## (undated) DEVICE — SUT NYLON 2-0 18" FS

## (undated) DEVICE — SUT SILK 2-0 18" FS

## (undated) DEVICE — SOL IRR BAG NS 0.9% 3000ML

## (undated) DEVICE — DRSG XEROFORM 5 X 9"

## (undated) DEVICE — NDL COUNTER FOAM AND MAGNET 40-70

## (undated) DEVICE — SUT PDS II 3-0 27" SH UNDYED

## (undated) DEVICE — PREP BETADINE KIT

## (undated) DEVICE — GLV 7.5 PROTEXIS (WHITE)

## (undated) DEVICE — STAPLER SKIN MULTI DIRECTION W35

## (undated) DEVICE — SUT PDS II 3-0 27" SH

## (undated) DEVICE — DRSG COMBINE 5 X 9"

## (undated) DEVICE — DRSG MAMMARY SUPPORT MED SIZE 2

## (undated) DEVICE — DRSG XEROFORM 2 X 2"

## (undated) DEVICE — TUBING TRUWAVE PRESSURE MALE/FEMALE 12"

## (undated) DEVICE — DRSG TAPE MICROFOAM 3"

## (undated) DEVICE — SUT STRATAFIX SPIRAL MONOCRYL PLUS 3-0 30CM PS UNDYED

## (undated) DEVICE — BIPOLAR FORCEP KIRWAN JEWELERS STR 4" X 0.4MM W 12FT CORD (GREEN)

## (undated) DEVICE — GLV 6.5 PROTEXIS (WHITE)

## (undated) DEVICE — DRAPE ARMATEC MICROSSCOPE HANDLE 5" X 10"

## (undated) DEVICE — SYR LUER LOK 10CC

## (undated) DEVICE — ELCTR BOVIE TIP BLADE INSULATED 6.5" EDGE

## (undated) DEVICE — WARMING BLANKET FULL UNDERBODY

## (undated) DEVICE — ZIMMER PULSAVAC PLUS FAN KIT

## (undated) DEVICE — DRAPE INSTRUMENT POUCH 6.75" X 11"

## (undated) DEVICE — DRSG MASTISOL

## (undated) DEVICE — PREP CHLORAPREP HI-LITE ORANGE 26ML

## (undated) DEVICE — ELCTR BOVIE PENCIL SMOKE EVACUATION

## (undated) DEVICE — DRSG ACE BANDAGE 6"

## (undated) DEVICE — SUT STRATAFIX SPIRAL PDS PLUS 1 30X30CM CT-1 VIOLET

## (undated) DEVICE — DRSG MAMMARY SUPPORT SM SIZE 1

## (undated) DEVICE — DOPPLER PROBE  CABLE